# Patient Record
Sex: MALE | Race: WHITE | Employment: OTHER | URBAN - METROPOLITAN AREA
[De-identification: names, ages, dates, MRNs, and addresses within clinical notes are randomized per-mention and may not be internally consistent; named-entity substitution may affect disease eponyms.]

---

## 2017-05-27 ENCOUNTER — HOSPITAL ENCOUNTER (OUTPATIENT)
Age: 72
Discharge: HOME | End: 2017-05-27
Payer: MEDICARE

## 2017-05-27 DIAGNOSIS — N18.30 CKD (CHRONIC KIDNEY DISEASE) STAGE 3, GFR 30-59 ML/MIN (HCC): Chronic | ICD-10-CM

## 2017-05-27 LAB
ANION GAP SERPL CALCULATED.3IONS-SCNC: 11 MMOL/L (ref 9–17)
BUN BLDV-MCNC: 27 MG/DL (ref 8–23)
BUN/CREAT BLD: ABNORMAL (ref 9–20)
CALCIUM IONIZED: 1.26 MMOL/L (ref 1.13–1.33)
CALCIUM SERPL-MCNC: 9.1 MG/DL (ref 8.6–10.4)
CHLORIDE BLD-SCNC: 101 MMOL/L (ref 98–107)
CO2: 28 MMOL/L (ref 20–31)
CREAT SERPL-MCNC: 1.44 MG/DL (ref 0.7–1.2)
CREATININE URINE: 90.3 MG/DL (ref 39–259)
GFR AFRICAN AMERICAN: 59 ML/MIN
GFR NON-AFRICAN AMERICAN: 48 ML/MIN
GFR SERPL CREATININE-BSD FRML MDRD: ABNORMAL ML/MIN/{1.73_M2}
GFR SERPL CREATININE-BSD FRML MDRD: ABNORMAL ML/MIN/{1.73_M2}
GLUCOSE BLD-MCNC: 149 MG/DL (ref 70–99)
PHOSPHORUS: 3.9 MG/DL (ref 2.5–4.5)
POTASSIUM SERPL-SCNC: 4.3 MMOL/L (ref 3.7–5.3)
PTH INTACT: 55.23 PG/ML (ref 15–65)
SODIUM BLD-SCNC: 140 MMOL/L (ref 135–144)
TOTAL PROTEIN, URINE: 10 MG/DL
VITAMIN D 25-HYDROXY: 40.6 NG/ML (ref 30–100)

## 2017-05-27 PROCEDURE — 83970 ASSAY OF PARATHORMONE: CPT

## 2017-05-27 PROCEDURE — 80048 BASIC METABOLIC PNL TOTAL CA: CPT

## 2017-05-27 PROCEDURE — 82306 VITAMIN D 25 HYDROXY: CPT

## 2017-05-27 PROCEDURE — 36415 COLL VENOUS BLD VENIPUNCTURE: CPT

## 2017-05-27 PROCEDURE — 82570 ASSAY OF URINE CREATININE: CPT

## 2017-05-27 PROCEDURE — 82330 ASSAY OF CALCIUM: CPT

## 2017-05-27 PROCEDURE — 84156 ASSAY OF PROTEIN URINE: CPT

## 2017-05-27 PROCEDURE — 84100 ASSAY OF PHOSPHORUS: CPT

## 2017-06-09 PROBLEM — I51.9 LEFT VENTRICULAR DIASTOLIC DYSFUNCTION: Status: ACTIVE | Noted: 2017-06-09

## 2017-06-09 PROBLEM — I51.9 LEFT VENTRICULAR DIASTOLIC DYSFUNCTION: Chronic | Status: ACTIVE | Noted: 2017-06-09

## 2017-06-09 PROBLEM — I25.10 CORONARY ARTERY DISEASE INVOLVING NATIVE CORONARY ARTERY: Status: ACTIVE | Noted: 2017-06-09

## 2017-06-09 PROBLEM — I25.10 CORONARY ARTERY DISEASE INVOLVING NATIVE CORONARY ARTERY: Chronic | Status: ACTIVE | Noted: 2017-06-09

## 2017-10-12 ENCOUNTER — OFFICE VISIT (OUTPATIENT)
Dept: PODIATRY | Age: 72
End: 2017-10-12
Payer: MEDICARE

## 2017-10-12 VITALS
BODY MASS INDEX: 28.09 KG/M2 | HEART RATE: 66 BPM | SYSTOLIC BLOOD PRESSURE: 118 MMHG | WEIGHT: 179 LBS | DIASTOLIC BLOOD PRESSURE: 64 MMHG | HEIGHT: 67 IN

## 2017-10-12 DIAGNOSIS — B35.1 ONYCHOMYCOSIS OF TOENAIL: Primary | ICD-10-CM

## 2017-10-12 DIAGNOSIS — M79.675 PAIN OF TOES OF BOTH FEET: ICD-10-CM

## 2017-10-12 DIAGNOSIS — M20.42 HAMMER TOES OF BOTH FEET: ICD-10-CM

## 2017-10-12 DIAGNOSIS — M79.674 PAIN OF TOES OF BOTH FEET: ICD-10-CM

## 2017-10-12 DIAGNOSIS — E11.51 TYPE 2 DIABETES MELLITUS WITH PERIPHERAL VASCULAR DISEASE (HCC): ICD-10-CM

## 2017-10-12 DIAGNOSIS — M20.41 HAMMER TOES OF BOTH FEET: ICD-10-CM

## 2017-10-12 PROCEDURE — 99203 OFFICE O/P NEW LOW 30 MIN: CPT | Performed by: PODIATRIST

## 2017-10-12 PROCEDURE — 3017F COLORECTAL CA SCREEN DOC REV: CPT | Performed by: PODIATRIST

## 2017-10-12 PROCEDURE — 4040F PNEUMOC VAC/ADMIN/RCVD: CPT | Performed by: PODIATRIST

## 2017-10-12 PROCEDURE — G8598 ASA/ANTIPLAT THER USED: HCPCS | Performed by: PODIATRIST

## 2017-10-12 PROCEDURE — 3046F HEMOGLOBIN A1C LEVEL >9.0%: CPT | Performed by: PODIATRIST

## 2017-10-12 PROCEDURE — 1036F TOBACCO NON-USER: CPT | Performed by: PODIATRIST

## 2017-10-12 PROCEDURE — G8419 CALC BMI OUT NRM PARAM NOF/U: HCPCS | Performed by: PODIATRIST

## 2017-10-12 PROCEDURE — G8484 FLU IMMUNIZE NO ADMIN: HCPCS | Performed by: PODIATRIST

## 2017-10-12 PROCEDURE — 1123F ACP DISCUSS/DSCN MKR DOCD: CPT | Performed by: PODIATRIST

## 2017-10-12 PROCEDURE — G8427 DOCREV CUR MEDS BY ELIG CLIN: HCPCS | Performed by: PODIATRIST

## 2017-10-12 ASSESSMENT — ENCOUNTER SYMPTOMS
COLOR CHANGE: 0
DIARRHEA: 0
NAUSEA: 0
BACK PAIN: 0
SHORTNESS OF BREATH: 0

## 2017-10-12 NOTE — PROGRESS NOTES
[place] monofilament. Musculoskeletal:  Muscle strength is +5/5 to all four muscle groups of the right lower extremity and +5/5 to all four muscle groups of the left lower extremity. There are no areas of subluxation, dislocation, or laxity noted to either lower extremity. Range of motion to the right ankle is  free of pain or grinding. Range of motion to the left ankle is  free of pain or grinding. Range of motion to the right subtalar joint is  free of pain or grinding. Range of motion to the left subtalar joint is  free of pain or grinding. No abnormalities, asymmetries, or misalignments are seen between the extremities. Weightbearing evaluation does not reveal rearfoot eversion, medial prominence of the talar head, loss of the medial longitudinal arch height, and too many toes sign bilaterally. The digits of the right foot are contracted. The digits of the left foot are contracted. There is no prominence noted to the first metatarsal head without abduction of the hallux of the right foot. There is no prominence noted to the first metatarsal head without abduction of the hallux of the left foot. Shoe examination was performed. Biomechanical Exam: normal bilaterally. Asessment: Patient is a 70 y.o. male with:   1. Onychomycosis of toenail     2. Type 2 diabetes mellitus with peripheral vascular disease (HCC)     3. Hammer toes of both feet     4. Pain of toes of both feet         Plan:  1. Clinical evaluation of the patient. 2. Patient instructed on proper diabetic foot care. 3. Contact office with any questions/problems/concerns. Return in about 3 months (around 1/12/2018) for Painful Nails.    10/12/2017      Ijeoma Shankar DPM

## 2017-11-25 ENCOUNTER — HOSPITAL ENCOUNTER (OUTPATIENT)
Age: 72
Discharge: HOME | End: 2017-11-25
Payer: MEDICARE

## 2017-11-25 DIAGNOSIS — N18.30 CKD (CHRONIC KIDNEY DISEASE) STAGE 3, GFR 30-59 ML/MIN (HCC): Chronic | ICD-10-CM

## 2017-11-25 LAB
ANION GAP SERPL CALCULATED.3IONS-SCNC: 10 MMOL/L (ref 9–17)
BUN BLDV-MCNC: 28 MG/DL (ref 8–23)
BUN/CREAT BLD: ABNORMAL (ref 9–20)
CALCIUM SERPL-MCNC: 9.6 MG/DL (ref 8.6–10.4)
CHLORIDE BLD-SCNC: 101 MMOL/L (ref 98–107)
CHOLESTEROL/HDL RATIO: 3.3
CHOLESTEROL: 127 MG/DL
CO2: 29 MMOL/L (ref 20–31)
CREAT SERPL-MCNC: 1.56 MG/DL (ref 0.7–1.2)
CREATININE URINE: 126.5 MG/DL (ref 39–259)
GFR AFRICAN AMERICAN: 53 ML/MIN
GFR NON-AFRICAN AMERICAN: 44 ML/MIN
GFR SERPL CREATININE-BSD FRML MDRD: ABNORMAL ML/MIN/{1.73_M2}
GFR SERPL CREATININE-BSD FRML MDRD: ABNORMAL ML/MIN/{1.73_M2}
GLUCOSE BLD-MCNC: 183 MG/DL (ref 70–99)
HDLC SERPL-MCNC: 39 MG/DL
LDL CHOLESTEROL: 58 MG/DL (ref 0–130)
POTASSIUM SERPL-SCNC: 4.7 MMOL/L (ref 3.7–5.3)
SODIUM BLD-SCNC: 140 MMOL/L (ref 135–144)
TOTAL PROTEIN, URINE: 11 MG/DL
TRIGL SERPL-MCNC: 148 MG/DL
TSH SERPL DL<=0.05 MIU/L-ACNC: 1.55 MIU/L (ref 0.3–5)
VLDLC SERPL CALC-MCNC: ABNORMAL MG/DL (ref 1–30)

## 2017-11-25 PROCEDURE — 36415 COLL VENOUS BLD VENIPUNCTURE: CPT

## 2017-11-25 PROCEDURE — 84156 ASSAY OF PROTEIN URINE: CPT

## 2017-11-25 PROCEDURE — 80048 BASIC METABOLIC PNL TOTAL CA: CPT

## 2017-11-25 PROCEDURE — 84443 ASSAY THYROID STIM HORMONE: CPT

## 2017-11-25 PROCEDURE — 82570 ASSAY OF URINE CREATININE: CPT

## 2017-11-25 PROCEDURE — 80061 LIPID PANEL: CPT

## 2018-06-04 ENCOUNTER — HOSPITAL ENCOUNTER (OUTPATIENT)
Age: 73
Discharge: HOME | End: 2018-06-04
Payer: MEDICARE

## 2018-06-04 DIAGNOSIS — N18.30 CKD (CHRONIC KIDNEY DISEASE) STAGE 3, GFR 30-59 ML/MIN (HCC): Chronic | ICD-10-CM

## 2018-06-04 LAB
ABSOLUTE EOS #: 0.1 K/UL (ref 0–0.4)
ABSOLUTE IMMATURE GRANULOCYTE: ABNORMAL K/UL (ref 0–0.3)
ABSOLUTE LYMPH #: 1.3 K/UL (ref 1–4.8)
ABSOLUTE MONO #: 0.5 K/UL (ref 0.1–1.3)
ANION GAP SERPL CALCULATED.3IONS-SCNC: 10 MMOL/L (ref 9–17)
BASOPHILS # BLD: 0 % (ref 0–2)
BASOPHILS ABSOLUTE: 0 K/UL (ref 0–0.2)
BUN BLDV-MCNC: 28 MG/DL (ref 8–23)
BUN/CREAT BLD: ABNORMAL (ref 9–20)
CALCIUM IONIZED: 1.23 MMOL/L (ref 1.13–1.33)
CALCIUM SERPL-MCNC: 9 MG/DL (ref 8.6–10.4)
CHLORIDE BLD-SCNC: 102 MMOL/L (ref 98–107)
CO2: 28 MMOL/L (ref 20–31)
CREAT SERPL-MCNC: 1.44 MG/DL (ref 0.7–1.2)
CREATININE URINE: 152.8 MG/DL (ref 39–259)
DIFFERENTIAL TYPE: ABNORMAL
EOSINOPHILS RELATIVE PERCENT: 3 % (ref 0–4)
GFR AFRICAN AMERICAN: 58 ML/MIN
GFR NON-AFRICAN AMERICAN: 48 ML/MIN
GFR SERPL CREATININE-BSD FRML MDRD: ABNORMAL ML/MIN/{1.73_M2}
GFR SERPL CREATININE-BSD FRML MDRD: ABNORMAL ML/MIN/{1.73_M2}
GLUCOSE BLD-MCNC: 142 MG/DL (ref 70–99)
HCT VFR BLD CALC: 39.6 % (ref 41–53)
HEMOGLOBIN: 13.5 G/DL (ref 13.5–17.5)
IMMATURE GRANULOCYTES: ABNORMAL %
LYMPHOCYTES # BLD: 25 % (ref 24–44)
MCH RBC QN AUTO: 32.5 PG (ref 26–34)
MCHC RBC AUTO-ENTMCNC: 34.1 G/DL (ref 31–37)
MCV RBC AUTO: 95.4 FL (ref 80–100)
MONOCYTES # BLD: 9 % (ref 1–7)
NRBC AUTOMATED: ABNORMAL PER 100 WBC
PDW BLD-RTO: 14.8 % (ref 11.5–14.9)
PHOSPHORUS: 3.1 MG/DL (ref 2.5–4.5)
PLATELET # BLD: 135 K/UL (ref 150–450)
PLATELET ESTIMATE: ABNORMAL
PMV BLD AUTO: 8.8 FL (ref 6–12)
POTASSIUM SERPL-SCNC: 3.8 MMOL/L (ref 3.7–5.3)
PTH INTACT: 51.21 PG/ML (ref 15–65)
RBC # BLD: 4.15 M/UL (ref 4.5–5.9)
RBC # BLD: ABNORMAL 10*6/UL
SEG NEUTROPHILS: 63 % (ref 36–66)
SEGMENTED NEUTROPHILS ABSOLUTE COUNT: 3.4 K/UL (ref 1.3–9.1)
SODIUM BLD-SCNC: 140 MMOL/L (ref 135–144)
TOTAL PROTEIN, URINE: 12 MG/DL
VITAMIN D 25-HYDROXY: 29.9 NG/ML (ref 30–100)
WBC # BLD: 5.4 K/UL (ref 3.5–11)
WBC # BLD: ABNORMAL 10*3/UL

## 2018-06-04 PROCEDURE — 36415 COLL VENOUS BLD VENIPUNCTURE: CPT

## 2018-06-04 PROCEDURE — 82306 VITAMIN D 25 HYDROXY: CPT

## 2018-06-04 PROCEDURE — 82570 ASSAY OF URINE CREATININE: CPT

## 2018-06-04 PROCEDURE — 83970 ASSAY OF PARATHORMONE: CPT

## 2018-06-04 PROCEDURE — 85025 COMPLETE CBC W/AUTO DIFF WBC: CPT

## 2018-06-04 PROCEDURE — 80048 BASIC METABOLIC PNL TOTAL CA: CPT

## 2018-06-04 PROCEDURE — 84100 ASSAY OF PHOSPHORUS: CPT

## 2018-06-04 PROCEDURE — 84156 ASSAY OF PROTEIN URINE: CPT

## 2018-06-04 PROCEDURE — 82330 ASSAY OF CALCIUM: CPT

## 2018-10-30 ENCOUNTER — HOSPITAL ENCOUNTER (OUTPATIENT)
Age: 73
Discharge: HOME | End: 2018-10-30
Payer: MEDICARE

## 2018-10-30 LAB
ABSOLUTE EOS #: 0.1 K/UL (ref 0–0.4)
ABSOLUTE IMMATURE GRANULOCYTE: ABNORMAL K/UL (ref 0–0.3)
ABSOLUTE LYMPH #: 1.3 K/UL (ref 1–4.8)
ABSOLUTE MONO #: 0.5 K/UL (ref 0.1–1.3)
ALBUMIN SERPL-MCNC: 4.1 G/DL (ref 3.5–5.2)
ALBUMIN/GLOBULIN RATIO: ABNORMAL (ref 1–2.5)
ALP BLD-CCNC: 21 U/L (ref 40–129)
ALT SERPL-CCNC: 21 U/L (ref 5–41)
ANION GAP SERPL CALCULATED.3IONS-SCNC: 12 MMOL/L (ref 9–17)
AST SERPL-CCNC: 15 U/L
BASOPHILS # BLD: 0 % (ref 0–2)
BASOPHILS ABSOLUTE: 0 K/UL (ref 0–0.2)
BILIRUB SERPL-MCNC: 0.65 MG/DL (ref 0.3–1.2)
BUN BLDV-MCNC: 27 MG/DL (ref 8–23)
BUN/CREAT BLD: ABNORMAL (ref 9–20)
CALCIUM SERPL-MCNC: 9.3 MG/DL (ref 8.6–10.4)
CHLORIDE BLD-SCNC: 100 MMOL/L (ref 98–107)
CHOLESTEROL/HDL RATIO: 3.4
CHOLESTEROL: 133 MG/DL
CO2: 26 MMOL/L (ref 20–31)
CREAT SERPL-MCNC: 1.48 MG/DL (ref 0.7–1.2)
CREATININE URINE: 83.1 MG/DL (ref 39–259)
DIFFERENTIAL TYPE: ABNORMAL
EOSINOPHILS RELATIVE PERCENT: 2 % (ref 0–4)
ESTIMATED AVERAGE GLUCOSE: 134 MG/DL
GFR AFRICAN AMERICAN: 57 ML/MIN
GFR NON-AFRICAN AMERICAN: 47 ML/MIN
GFR SERPL CREATININE-BSD FRML MDRD: ABNORMAL ML/MIN/{1.73_M2}
GFR SERPL CREATININE-BSD FRML MDRD: ABNORMAL ML/MIN/{1.73_M2}
GLUCOSE BLD-MCNC: 138 MG/DL (ref 70–99)
HBA1C MFR BLD: 6.3 % (ref 4–6)
HCT VFR BLD CALC: 42.9 % (ref 41–53)
HDLC SERPL-MCNC: 39 MG/DL
HEMOGLOBIN: 14.5 G/DL (ref 13.5–17.5)
IMMATURE GRANULOCYTES: ABNORMAL %
LDL CHOLESTEROL: 65 MG/DL (ref 0–130)
LYMPHOCYTES # BLD: 22 % (ref 24–44)
MAGNESIUM: 1.8 MG/DL (ref 1.6–2.6)
MCH RBC QN AUTO: 32.3 PG (ref 26–34)
MCHC RBC AUTO-ENTMCNC: 33.8 G/DL (ref 31–37)
MCV RBC AUTO: 95.6 FL (ref 80–100)
MICROALBUMIN/CREAT 24H UR: <12 MG/L
MICROALBUMIN/CREAT UR-RTO: NORMAL MCG/MG CREAT
MONOCYTES # BLD: 8 % (ref 1–7)
NRBC AUTOMATED: ABNORMAL PER 100 WBC
PDW BLD-RTO: 13.9 % (ref 11.5–14.9)
PLATELET # BLD: 128 K/UL (ref 150–450)
PLATELET ESTIMATE: ABNORMAL
PMV BLD AUTO: 8.8 FL (ref 6–12)
POTASSIUM SERPL-SCNC: 4.4 MMOL/L (ref 3.7–5.3)
RBC # BLD: 4.49 M/UL (ref 4.5–5.9)
RBC # BLD: ABNORMAL 10*6/UL
SEG NEUTROPHILS: 68 % (ref 36–66)
SEGMENTED NEUTROPHILS ABSOLUTE COUNT: 3.9 K/UL (ref 1.3–9.1)
SODIUM BLD-SCNC: 138 MMOL/L (ref 135–144)
TOTAL PROTEIN: 6.5 G/DL (ref 6.4–8.3)
TRIGL SERPL-MCNC: 143 MG/DL
VLDLC SERPL CALC-MCNC: ABNORMAL MG/DL (ref 1–30)
WBC # BLD: 5.7 K/UL (ref 3.5–11)
WBC # BLD: ABNORMAL 10*3/UL

## 2018-10-30 PROCEDURE — 36415 COLL VENOUS BLD VENIPUNCTURE: CPT

## 2018-10-30 PROCEDURE — 83735 ASSAY OF MAGNESIUM: CPT

## 2018-10-30 PROCEDURE — 83036 HEMOGLOBIN GLYCOSYLATED A1C: CPT

## 2018-10-30 PROCEDURE — 82043 UR ALBUMIN QUANTITATIVE: CPT

## 2018-10-30 PROCEDURE — 80061 LIPID PANEL: CPT

## 2018-10-30 PROCEDURE — 85025 COMPLETE CBC W/AUTO DIFF WBC: CPT

## 2018-10-30 PROCEDURE — 82570 ASSAY OF URINE CREATININE: CPT

## 2018-10-30 PROCEDURE — 80053 COMPREHEN METABOLIC PANEL: CPT

## 2018-11-26 ENCOUNTER — HOSPITAL ENCOUNTER (OUTPATIENT)
Age: 73
Discharge: HOME | End: 2018-11-26
Payer: MEDICARE

## 2018-11-26 DIAGNOSIS — N18.30 CKD (CHRONIC KIDNEY DISEASE) STAGE 3, GFR 30-59 ML/MIN (HCC): Chronic | ICD-10-CM

## 2018-11-26 LAB
ANION GAP SERPL CALCULATED.3IONS-SCNC: 10 MMOL/L (ref 9–17)
BUN BLDV-MCNC: 23 MG/DL (ref 8–23)
BUN/CREAT BLD: ABNORMAL (ref 9–20)
CALCIUM SERPL-MCNC: 9.3 MG/DL (ref 8.6–10.4)
CHLORIDE BLD-SCNC: 100 MMOL/L (ref 98–107)
CO2: 29 MMOL/L (ref 20–31)
CREAT SERPL-MCNC: 1.47 MG/DL (ref 0.7–1.2)
CREATININE URINE: 95.6 MG/DL (ref 39–259)
GFR AFRICAN AMERICAN: 57 ML/MIN
GFR NON-AFRICAN AMERICAN: 47 ML/MIN
GFR SERPL CREATININE-BSD FRML MDRD: ABNORMAL ML/MIN/{1.73_M2}
GFR SERPL CREATININE-BSD FRML MDRD: ABNORMAL ML/MIN/{1.73_M2}
GLUCOSE BLD-MCNC: 131 MG/DL (ref 70–99)
POTASSIUM SERPL-SCNC: 4.1 MMOL/L (ref 3.7–5.3)
SODIUM BLD-SCNC: 139 MMOL/L (ref 135–144)
TOTAL PROTEIN, URINE: 8 MG/DL

## 2018-11-26 PROCEDURE — 84156 ASSAY OF PROTEIN URINE: CPT

## 2018-11-26 PROCEDURE — 80048 BASIC METABOLIC PNL TOTAL CA: CPT

## 2018-11-26 PROCEDURE — 36415 COLL VENOUS BLD VENIPUNCTURE: CPT

## 2018-11-26 PROCEDURE — 82570 ASSAY OF URINE CREATININE: CPT

## 2019-06-07 PROBLEM — Z95.1 HISTORY OF CORONARY ARTERY BYPASS GRAFT: Chronic | Status: ACTIVE | Noted: 2019-06-07

## 2019-06-07 PROBLEM — Z95.1 HISTORY OF CORONARY ARTERY BYPASS GRAFT: Status: ACTIVE | Noted: 2019-06-07

## 2022-09-09 DIAGNOSIS — M25.572 LEFT ANKLE PAIN, UNSPECIFIED CHRONICITY: Primary | ICD-10-CM

## 2022-09-12 ENCOUNTER — OFFICE VISIT (OUTPATIENT)
Dept: ORTHOPEDIC SURGERY | Age: 77
End: 2022-09-12
Payer: MEDICARE

## 2022-09-12 VITALS — WEIGHT: 154 LBS | BODY MASS INDEX: 24.17 KG/M2 | OXYGEN SATURATION: 100 % | RESPIRATION RATE: 16 BRPM | HEIGHT: 67 IN

## 2022-09-12 DIAGNOSIS — S93.402A SPRAIN OF LEFT ANKLE, UNSPECIFIED LIGAMENT, INITIAL ENCOUNTER: Primary | ICD-10-CM

## 2022-09-12 DIAGNOSIS — M19.079 ARTHRITIS OF SUBTALAR JOINT: ICD-10-CM

## 2022-09-12 PROCEDURE — 99204 OFFICE O/P NEW MOD 45 MIN: CPT | Performed by: ORTHOPAEDIC SURGERY

## 2022-09-12 PROCEDURE — G8427 DOCREV CUR MEDS BY ELIG CLIN: HCPCS | Performed by: ORTHOPAEDIC SURGERY

## 2022-09-12 PROCEDURE — 1036F TOBACCO NON-USER: CPT | Performed by: ORTHOPAEDIC SURGERY

## 2022-09-12 PROCEDURE — G8420 CALC BMI NORM PARAMETERS: HCPCS | Performed by: ORTHOPAEDIC SURGERY

## 2022-09-12 PROCEDURE — 1123F ACP DISCUSS/DSCN MKR DOCD: CPT | Performed by: ORTHOPAEDIC SURGERY

## 2022-09-12 NOTE — PROGRESS NOTES
Rfl:     metoprolol (LOPRESSOR) 100 MG tablet, Take 100 mg by mouth 2 times daily, Disp: , Rfl:     SITagliptin (JANUVIA) 50 MG tablet, Take 50 mg by mouth daily, Disp: , Rfl:     apixaban (ELIQUIS) 2.5 MG TABS tablet, Take by mouth 2 times daily, Disp: , Rfl:     isosorbide mononitrate (IMDUR) 60 MG CR tablet, Take 60 mg by mouth 2 times daily , Disp: , Rfl:     aspirin 81 MG tablet, Take 81 mg by mouth daily. , Disp: , Rfl:     omeprazole (PRILOSEC) 20 MG capsule, Take 20 mg by mouth nightly , Disp: , Rfl:     insulin glargine (LANTUS) 100 UNIT/ML injection, Inject 26 Units into the skin nightly , Disp: , Rfl:      Allergies:    Coumadin [warfarin sodium], Morphine, and Doxycycline    Family History:  family history includes Diabetes in his brother, mother, and sister; Heart Disease in his mother and sister; High Blood Pressure in his mother and sister. Social History:   Social History     Occupational History    Not on file   Tobacco Use    Smoking status: Never    Smokeless tobacco: Never   Vaping Use    Vaping Use: Never used   Substance and Sexual Activity    Alcohol use: No     Alcohol/week: 0.0 standard drinks    Drug use: No    Sexual activity: Not on file     Retired    OBJECTIVE:  Resp 16   Ht 5' 6.5\" (1.689 m)   Wt 154 lb (69.9 kg)   SpO2 100%   BMI 24.48 kg/m²    Psych: awake, alert  Cardio:  well perfused extremities, no cyanosis  Resp:  normal respiratory effort  Musculoskeletal:    Affected lower extremity:    Vascular: Limb well perfused, compartments soft/compressible. Skin: No erythema/ulcers. Intact. Neurovascular Status:  Grossly neurovascularly intact throughout  Motion:  Grossly able to fire major muscle groups with appropriate/expected AROM  Tenderness to Palpation: Sinus Tarsi/CFL greater than ATFL  -Able to ashkan the foot  --Instability:  Talar tilt: Negative;  Anterior drawer: Negative  -No peroneal subluxation/dislocation with dorsiflexion + eversion or with circumduction      RADIOLOGY:   9/12/2022 FINDINGS:  Three weightbearing views (AP, Mortise, and Lateral) of the left ankle and three weightbearing views (AP, Oblique, Lateral) of the left foot were obtained in the office today and reviewed, revealing no acute fracture, dislocation, or radioopaque foreign body/tumor. The ankle mortise is maintained with no widening of the clear spaces. Overall alignment is satisfactory. Degenerative changes at the subtalar joint with joint space narrowing, sclerosis, and osteophytes. IMPRESSION:  No acute fracture/dislocation. Electronically signed by Rachel Donaldson MD       ASSESSMENT AND PLAN:  Body mass index is 24.48 kg/m². He has left lateral ankle/foot pain, possibly secondary to an ankle sprain sustained around 6/12/2022. 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 Notably, he has the past medical history as above. He has a history of GERD, coronary artery disease status post CABG, chronic kidney disease, vascular disease with a history of claudication, and type 2 diabetes. We had a discussion today about the likely diagnosis and its natural history, physical exam and imaging findings, as well as various treatment options in detail. Surgically, we discussed a possible future surgery, depending on the patient's clinical course. At today's visit, we did decide to proceed with conservative management. Orders/referrals were placed as below at today's visit. The patient was provided a lace up brace, for stability and pain control, and to help decrease the risk of reinjury. The patient may weight bear as tolerated, and was cautioned to avoid pain provoking activity. The patient was referred to physical therapy. I provided a prescription for Voltaren (4g TOPL q QID PRN pain). I recommend this over the use of oral NSAIDs because of the patient's history of GERD/GI ulcer.     All questions were answered and the above plan was agreed upon. The patient will return to clinic in 6 weeks PRN without x-rays. At his next visit, we may consider an MRI and/or subtalar joint injection. At the patient's next visit, depending on how the patient is doing and/or new imaging/labs results, we may consider the following options:    []  Lace up ankle     []  CAM boot         []  removable wrist brace     []  PT:        []  Wean out immobilization         []  Adv activity      []  Footmind        []  Spenco       []  Custom Orthotic:               []  AZ brace                    []  Rocker Bottom      []  Night splint    []  Heel cups        []  Strap        []  Toe gizmos    []  Topl        []  NSAIDs         []  Ayla        []  Ref:         []  Stress Xray    []  CT        []  MRI  []  Inj:          []  Consider OR      []  Pick OR date    No follow-ups on file. No orders of the defined types were placed in this encounter. No orders of the defined types were placed in this encounter. Temo Leach MD  Orthopedic Surgery        Please excuse any typos/errors, as this note was created with the assistance of voice recognition software. While intending to generate a document that actually reflects the content of the visit, the document can still have some errors including those of syntax and sound-a-like substitutions which may escape proof reading. In such instances, actual meaning can be extrapolated by context.

## 2022-09-12 NOTE — LETTER
[address]  [facility] [address] [facility] [address]  Phone: [phone]  Fax: [phone]           Sedrick Yign MD      September 12, 2022     Patient: Dirk Lynch   MR Number: [id number]   YOB: 1945   Date of Visit: 9/12/2022       Dear Dr. Avani Ac:    Thank you for referring Twan Pennington to me for evaluation/treatment. Below are the relevant portions of my assessment and plan of care. He has left lateral ankle/foot pain, possibly secondary to an ankle sprain sustained around 6/12/2022. 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 Notably, he has the past medical history as above. He has a history of GERD, coronary artery disease status post CABG, chronic kidney disease, vascular disease with a history of claudication, and type 2 diabetes. We had a discussion today about the likely diagnosis and its natural history, physical exam and imaging findings, as well as various treatment options in detail. Surgically, we discussed a possible future surgery, depending on the patient's clinical course. At today's visit, we did decide to proceed with conservative management. Orders/referrals were placed as below at today's visit. The patient was provided a lace up brace, for stability and pain control, and to help decrease the risk of reinjury. The patient may weight bear as tolerated, and was cautioned to avoid pain provoking activity. The patient was referred to physical therapy. I provided a prescription for Voltaren (4g TOPL q QID PRN pain). I recommend this over the use of oral NSAIDs because of the patient's history of GERD/GI ulcer. All questions were answered and the above plan was agreed upon. The patient will return to clinic in 6 weeks PRN without x-rays. At his next visit, we may consider an MRI and/or subtalar joint injection.            If you have questions, please do not hesitate to call me. I look forward to following Paresh cMmahon along with you.     Sincerely,        Raúl Gutierrez MD    CC providers:  Danika Linares MD  [address] [place] [address],[address]  [address] [facility] [address]  [employer] [employer]

## 2022-09-19 ENCOUNTER — HOSPITAL ENCOUNTER (OUTPATIENT)
Dept: PHYSICAL THERAPY | Age: 77
Setting detail: THERAPIES SERIES
Discharge: HOME | End: 2022-09-19
Payer: MEDICARE

## 2022-09-19 PROCEDURE — 97140 MANUAL THERAPY 1/> REGIONS: CPT

## 2022-09-19 PROCEDURE — 97162 PT EVAL MOD COMPLEX 30 MIN: CPT

## 2022-09-19 PROCEDURE — 97110 THERAPEUTIC EXERCISES: CPT

## 2022-09-19 NOTE — PROGRESS NOTES
Three weightbearing views (AP, Mortise, and Lateral)    of the left ankle and three weightbearing views (AP, Oblique, Lateral) of    the left foot were obtained in the office today and reviewed, revealing no    acute fracture, dislocation, or radioopaque foreign body/tumor. The ankle    mortise is maintained with no widening of the clear spaces. Overall    alignment is satisfactory. Degenerative changes at the subtalar joint    with joint space narrowing, sclerosis, and osteophytes.        9/12/22    Medications: [x] Refer to full medical record [] None [] Other:  Allergies:      [x] Refer to full medical record [] None [] Other:    Function:  Hand Dominance  [] Right  [] Left  Working:  [] Normal Duty  [] Light Duty  [] Off D/T Condition  [] Retired    [] Not Employed    []  Disability  [] Other:           Return to work:   Job/ADL Description:retired    Previously (I) with all functional activity, currently limited as listed below    ADL/IADL Previous level of function Current level of function Who currently assists the patient with task   Bathing  [] Independent  [] Assist [] Independent  [] Assist    Dress/grooming [] Independent  [] Assist [] Independent  [] Assist    Transfer/mobility [] Independent  [] Assist [] Independent  [] Assist    Feeding [] Independent  [] Assist [] Independent  [] Assist    Toileting [] Independent  [] Assist [] Independent  [] Assist    Driving [] Independent  [] Assist [] Independent  [] Assist    Housekeeping [] Independent  [] Assist [x] Independent  [] Assist Difficulty prolonged walking, squatting/kneeling   Grocery shop/meal prep [] Independent  [] Assist [x] Independent  [] Assist Difficulty prolonged walking     Gait Prior level of function Current level of function    [] Independent  [] Assist [] Independent  [] Assist   Device: [] Independent [] Independent    [] Straight Cane [] Quad cane [] Straight Cane [] Quad cane    [] Standard walker [] Rolling walker   [] 4 wheeled walker [] Standard walker [] Rolling walker   [] 4 wheeled walker    [] Wheelchair [] Wheelchair   Mild antalgic gait, did not wear brace into clinic but using for community ambulation per the patient. Pain:  [x] Yes  [] No Location: (L) lateral ankle  Pain Rating: (0-10 scale) 2-3 burning/10  Pain altered Tx:  [] Yes  [] No  Action:  Symptoms:  [] Improving [] Worsening [] Same  Better:  [] AM    [] PM    [] Sit    [] Rise/Sit    []Stand    [] Walk    [] Lying    [] Other:  Worse: [] AM    [] PM    [] Sit    [] Rise/Sit    []Stand    [] Walk    [] Lying    [] Bend                             [] Valsalva    [] Other:  Sleep: [] OK    [] Disturbed    Objective:  AROM:  DF 5 degrees (B)  PF (R) 60 degrees, (L) 50 degrees lateral ankle joint discomfort  INV (R) 45 degrees, (L) 45 degrees  Eversion (R) 20 degrees, (L) 15 degrees with lateral ankle discomfort    Strength:  DF, INV over edge of table (B) 4+/5  PF, EV over edge of table (R) 4+/5, (L) 4/5 with lateral ankle joint discomfort    Able to do (B) heel raise with minimal to no discomfort  SL heel raise (R) able, (L) unable    Balance:  Tandem 8 seconds (L) behind (R), 10 seconds with sway (R) behind (L)  Tandem foam 5 seconds (L) behind (R), 8 seconds (R) behind (L) WIDER BASE    OBSERVATION No Deficit Deficit Not Tested Comments   Posture       Forward Head [] [] []    Rounded Shoulders [] [] []    Kyphosis [] [] []    Lordosis [] [] []    Lateral Shift [] [] []    Scoliosis [] [] []    Iliac Crest [] [] []    PSIS [] [] []    ASIS [] [] []    Genu Valgus [] [] []    Genu Varus [] [] []    Genu Recurvatum [] [] []    Pronation [] [] []    Supination [] [] []    Leg Length Discrp [] [] []    Slumped Sitting [] [] []    Palpation [] [] [] MOD TTP lateral ankle joint, area of  ATF. Sensation [] [] []    Edema [] [] [] Bimalleolar right (R) 23, (L) 22.5cm  Figure 8 girth (R) 51, (L) 50cm  Does not exhibit significant swelling with gross inspection (L). Neurological [] [] []    Patellar Mobility [] [] []    Patellar Orientation [] [] []    Gait [] [] [] Analysis:         FUNCTION Normal Difficult Unable   Sitting [x] [] []   Standing [x] [] []   Ambulation [] [x] []   Groom/Dress [x] [] []   Lift/Carry [] [x] []   Stairs [] [x] []   Bending [] [x] []   Squat [] [x] []   Kneel [] [x] []                                   [place] Fall Risk Assessment    Risk Factor Scale  Score   History of Falls [] Yes  [] No 25  0 0   Secondary Diagnosis [] Yes  [] No 15  0 0   Ambulatory Aid [] Furniture  [] Crutches/cane/walker  [] None/bedrest/wheelchair/nurse 30  15  0 0   IV/Heparin Lock [] Yes  [] No 20  0 0   Gait/Transferring [] Impaired  [] Weak  [] Normal/bedrest/immobile 20  10  0 0   Mental Status [] Forgets limitations  [] Oriented to own ability 15  0 0      Total:0     Based on the Assessment score: check the appropriate box. [x]  No intervention needed   Low =   Score of 0-24    []  Use standard prevention interventions Moderate =  Score of 24-44   [] Give patient handout and discuss fall prevention strategies   [] Establish goal of education for patient/family RE: fall prevention strategies    []  Use high risk prevention interventions High = Score of 45 and higher   [] Give patient handout and discuss fall prevention strategies   [] Establish goal of education for patient/family Re: fall prevention strategies   [] Discuss lifeline / other resources    Electronically signed by:   Radha Delgado PT  Date: 9/19/2022     Comments:    Assessment:  Patient limited with balance, strength, functional strength, ROM, and functional tolerance of stairs, transferring in/out of truck, ambulation consistent with DX of lateral ankle sprain. [x] Patient would continue to benefit from skilled physical therapy services in order to: improve mobility, normalize strength. Problems:    [x] ? Pain: 2-3/10 burning    [x] ?  ROM: Limited with end range plantar flexion and eversion, (B) DF    [x] ? Strength: Limited with SL heel raise, limited minimally with eversion and PF in long sitting testing   [x] ? Function:  Limited with functional tolerance of stairs, transferring in/out of truck, prolonged walking        STG: (to be met in 8 treatments)  ? Pain: improved pain 0-1/10  ? ROM: Normalized ankle ROM with end range discomfort only  ? Strength: 4+/5 long sitting testing  ? Function: Able to do 6\" stairs in clinic with minimal to no difficulty, improved tolerance of walking up to 20 minutes with minimal to no complaints of pain or instability, improved balance tandem to 10 seconds (B)  Independent with Home Exercise Programs    LTG: (to be met in 12 treatments)  ? Pain: improved pain 0/10  ? ROM: Normalized ankle ROM with NO end range discomfort  ? Strength: 4+/5 long sitting testing  ?  Function: Able to do 8\" stairs in clinic with minimal to no difficulty, improved tolerance of walking as needed without complaints of instability; improved tandem balance with foam to 10 seconds (B)  Independent with Home Exercise Programs                   Patient goals: get rid of burning      Functional Assessment Used: optimal testing 8/3 41% limited for ambulation, ambulation long distances, stairs   Current Status: Score  Goal Status: Score 5/3 or better    Evaluation Complexity:  History (Personal factors, comorbidities) [] 0 [x] 1-2 [] 3+   Exam (limitations, restrictions) [] 1-2 [x] 3 [] 4+   Clinical presentation (progression) [] Stable [x] Evolving  [] Unstable   Decision Making [] Low [x] Moderate [] High    [] Low Complexity [x] Moderate Complexity [] High Complexity       Rehab Potential:  [x] Good  [] Fair  [x] Poor   Suggested Professional Referral:  [] No  [] Yes:  Barriers to Goal Achievement[de-identified]  [] No  [] Yes:  Domestic Concerns:  [] No  [] Yes:    Pt. Education:  [x] Plans/Goals, Risks/Benefits discussed  [x] Home exercise program    Method of Education: [x] Verbal  [] Demo  [x] Written  Comprehension of Education:  [] Verbalizes understanding. [] Demonstrates understanding. [] Needs Review. [] Demonstrates/verbalizes understanding of HEP/Ed previously given. Treatment Plan:  [x] Therapeutic Exercise   28646  [] Iontophoresis: 4 mg/mL Dexamethasone Sodium Phosphate  mAmin  21343   [] Therapeutic Activity  46477 [x] Vasopneumatic cold with compression  31793 PRN pain   [x] Gait Training   54664 [] Ultrasound   07238   [x] Neuromuscular Re-education  47832 [] Electrical Stimulation Unattended  45500   [x] Manual Therapy  73649 [] Electrical Stimulation Attended  04995   [x] Instruction in HEP  [] Lumbar/Cervical Traction  99935   [] Aquatic Therapy   61056 [] Cold/hotpack    [] Massage   56406      [] Dry Needling, 1 or 2 muscles  03457   [] Biofeedback, first 15 minutes   89520  [] Biofeedback, additional 15 minutes   87084 [] Dry Needling, 3 or more muscles  34457       []  Medication allergies reviewed for use of    Dexamethasone Sodium Phosphate 4mg/ml     with iontophoresis treatments. Pt is not allergic.     Frequency:  2-3 x/week for 12 visits        Todays Treatment:  Modalities:   Precautions:  Exercises:  Exercise Reps/ Time Weight/ Level Comments   (B) heel raise 15     Tandem balance 10\" x 2     Tandem balance foams wider base 10\" x 2     Supine calf stretch 3 x 20\"     Supine inversion stretch  3 x 20\"  towel   Other: Manual- traction, posterior talar glide, PROM  See above exercises for HEP    Specific Instructions for next treatment:    Treatment Charges: Mins Units   [x] Evaluation       []  Low       [x]  Moderate       []  High 15 1   []  Modalities     [x]  Ther Exercise 20 1   [x]  Manual Therapy 10 1   []  Ther Activities     []  Aquatics     []  Neuromuscular     []  Gait Training     []  Dry Needling           1-2 muscles     []  Dry Needling           3 or more          muscles     [] Vasocompression     []  Other         TOTAL TREATMENT TIME: 45    Time in:1000   Time Out:1045    Electronically signed by: Linette Lai PT        Physician Signature:________________________________Date:__________________  By signing above or cosigning this note, I have reviewed this plan of care and certify a need for medically necessary rehabilitation services.      *PLEASE SIGN ABOVE AND FAX BACK ALL PAGES*

## 2022-09-22 ENCOUNTER — HOSPITAL ENCOUNTER (OUTPATIENT)
Dept: PHYSICAL THERAPY | Age: 77
Setting detail: THERAPIES SERIES
Discharge: HOME | End: 2022-09-22
Payer: MEDICARE

## 2022-09-22 PROCEDURE — 97140 MANUAL THERAPY 1/> REGIONS: CPT

## 2022-09-22 PROCEDURE — 97110 THERAPEUTIC EXERCISES: CPT

## 2022-09-22 NOTE — FLOWSHEET NOTE
[facility]   [address]   Phone: [phone]   Fax: [phone]    Physical Therapy Daily Treatment Note      Date:  2022  Patient Name:  Meena Larson    :  1945  MRN: [id number]  Physician: Regine Lal MD                             Insurance: /[facility] secondary MEDICAL NECESSITY  Medical Diagnosis: sprain of (L) ankle S93.402A                Rehab Codes: (L) ankle pain M25.572  Onset date: referral 22             Next Dr's appt.: PRN  Visit Count:                                 Cancel/No Show: 0/0    Subjective:    : Patient arrived noting pain is non existent at the moment. Patient stats pain mostly occurs going up and down steps. Pain:  [] Yes  [x] No Location: L Lateral ankle  Pain Rating: (0-10 scale) denies /10, pain only with movements up/down steps, heel raise 5/10   Pain altered Tx:  [] No  [] Yes  Action:    Objective:  Modalities: CP 5' post exercise/manual   Precautions:  Exercises:  Exercise Reps/ Time Weight/ Level Comments Completed today    Schwinn warm up  4'  Seat 3 x   (B) heel raise 15 x 2    inc pain after 10, break, then resumed  x   Tandem balance 10\" x 2        Tandem balance foams wider base 10\" x 2        3 way hip 10x2  Stance on LLE only  x   BAPS- IV/EV/PF/DF/CW/CCW 10x2 ea Lvl 2   x   Fitter Board  10x2  Fwd/lat  x   SB stretch neutral/inv  3x30\"   x   Heel taps off step fwd/lat  15x 4'  x   Seated L ankle AROM  10x2 Yellow   x                 Other: Manual- traction, posterior talar glide, PROM- 8'   See above exercises for HEP         Assessment:   : Initiated gym program with emphasis on improving mobilization and normalizing strength in L ankle. Patient required cueing for proper exercise technique especially during BAPs,and fitter board exercise to refrain from hip/knee compensation.  Patient noted increased burning/fatiguing pain with B heel raises,and plantar flexion during resisted AROM. Patient continued to be moderately tender to touch in L lateral ankle, applied cold pack post exercise and manual for 5 minutes for pain and edema control. Educated and encouraged patient to ice intermittenly daily for pain and edema control with verbal confirmation. [x] Progressing toward goals. [] No change. [] Other:    [] Patient would continue to benefit from skilled physical therapy services in order to: improve mobility, normalize strength. STG: (to be met in 8 treatments)  ? Pain: improved pain 0-1/10  ? ROM: Normalized ankle ROM with end range discomfort only  ? Strength: 4+/5 long sitting testing  ? Function: Able to do 6\" stairs in clinic with minimal to no difficulty, improved tolerance of walking up to 20 minutes with minimal to no complaints of pain or instability, improved balance tandem to 10 seconds (B)  Independent with Home Exercise Programs     LTG: (to be met in 12 treatments)  ? Pain: improved pain 0/10  ? ROM: Normalized ankle ROM with NO end range discomfort  ? Strength: 4+/5 long sitting testing  ? Function: Able to do 8\" stairs in clinic with minimal to no difficulty, improved tolerance of walking as needed without complaints of instability; improved tandem balance with foam to 10 seconds (B)  Independent with Home Exercise Programs                    Patient goals: get rid of burning    Pt. Education:  [x] Yes  [] No  [] Reviewed Prior HEP/Ed  Method of Education: [x] Verbal  [] Demo  [] Written  Comprehension of Education:  [x] Verbalizes understanding. [] Demonstrates understanding. [] Needs review. [] Demonstrates/verbalizes HEP/Ed previously given. Plan: [x] Continue per plan of care.    [] Other:      Treatment Charges: Mins Units   []  Modalities     [x]  Ther Exercise 31 2   [x]  Manual Therapy 8 1   []  Ther Activities     []  Aquatics     []  Neuromuscular     [] Vasocompression     [] Gait Training     [] Dry needling        [] 1 or 2 muscles [] 3 or more muscles     [x]  Other- CP 5 0   Total Treatment time 44 3     Time In:844 am            Time Out: 102 am (-4 minutes for warm up)    Electronically signed by:  Fatimah Duron PTA

## 2022-09-26 ENCOUNTER — HOSPITAL ENCOUNTER (OUTPATIENT)
Dept: PHYSICAL THERAPY | Age: 77
Setting detail: THERAPIES SERIES
Discharge: HOME | End: 2022-09-26
Payer: MEDICARE

## 2022-09-26 PROCEDURE — 97110 THERAPEUTIC EXERCISES: CPT

## 2022-09-26 PROCEDURE — 97140 MANUAL THERAPY 1/> REGIONS: CPT

## 2022-09-26 NOTE — FLOWSHEET NOTE
[facility]   [address]   Phone: [phone]   Fax: [phone]    Physical Therapy Daily Treatment Note      Date:  2022  Patient Name:  Reagan John    :  1945  MRN: [id number]  Physician: Jaelyn Singh MD                             Insurance: /[facility] secondary MEDICAL NECESSITY  Medical Diagnosis: sprain of (L) ankle S93.402A                Rehab Codes: (L) ankle pain M25.572  Onset date: referral 22             Next Dr's appt.: PRN  Visit Count: 3/12                                Cancel/No Show: 0/0    Subjective:    :   Pain:  [x] Yes  [] No Location: L Lateral ankle  Pain Rating: (0-10 scale) 2/10  Pain altered Tx:  [] No  [] Yes  Action:    Objective:  Modalities: CP 5' post exercise/manual   Precautions:  Exercises:  Exercise Reps/ Time Weight/ Level Comments Completed today    Bard warm up  5'  Seat 3 while collecting subjective data x   (B) heel raise 15 x 2    inc pain after 10, break, then resumed  x   Tandem balance 20\" x 3     x   Tandem balance foams wider base 10\" x 2        3 way hip 10x2  Stance on LLE only  x   BAPS- IV/EV/PF/DF/CW/CCW 10x2 ea Lvl 2   x   Fitter Board  10x2  Fwd/lat  x   SB stretch neutral/inv  3x30\"   x   Heel taps off step fwd/lat  15x 4'     Seated L ankle AROM  10x2 Yellow   x   SLS 3x30\"  2 finger tip support x          Other: Manual- traction, posterior talar glide, in combination with DF stretching x 8'            Assessment:     [x] Progressing toward goals. Continued to focus on improving L ankle strength and neuromuscular control. Increased time with tandem stance and added SLS for additional challenge to muscle control. Slight increased pain reported at end of session that patient attributed mainly to muscle fatigue. [] No change.      [] Other:    [] Patient would continue to benefit from skilled physical therapy services in order to: improve mobility, normalize strength. STG: (to be met in 8 treatments)  ? Pain: improved pain 0-1/10  ? ROM: Normalized ankle ROM with end range discomfort only  ? Strength: 4+/5 long sitting testing  ? Function: Able to do 6\" stairs in clinic with minimal to no difficulty, improved tolerance of walking up to 20 minutes with minimal to no complaints of pain or instability, improved balance tandem to 10 seconds (B)  Independent with Home Exercise Programs     LTG: (to be met in 12 treatments)  ? Pain: improved pain 0/10  ? ROM: Normalized ankle ROM with NO end range discomfort  ? Strength: 4+/5 long sitting testing  ? Function: Able to do 8\" stairs in clinic with minimal to no difficulty, improved tolerance of walking as needed without complaints of instability; improved tandem balance with foam to 10 seconds (B)  Independent with Home Exercise Programs                    Patient goals: get rid of burning    Pt. Education:  [x] Yes  [] No  [] Reviewed Prior HEP/Ed  Method of Education: [x] Verbal  [] Demo  [] Written  Comprehension of Education:  [x] Verbalizes understanding. [] Demonstrates understanding. [] Needs review. [] Demonstrates/verbalizes HEP/Ed previously given. Plan: [x] Continue per plan of care.    [] Other:      Treatment Charges: Mins Units   []  Modalities     [x]  Ther Exercise 32 2   [x]  Manual Therapy 8 1   []  Ther Activities     []  Aquatics     []  Neuromuscular     [] Vasocompression     [] Gait Training     [] Dry needling        [] 1 or 2 muscles        [] 3 or more muscles     []  Other- CP     Total Treatment time 40 3     Time In: 2:47 pm            Time Out: 3:27 pm     Electronically signed by:  Eric Grace PTA

## 2022-09-30 ENCOUNTER — HOSPITAL ENCOUNTER (OUTPATIENT)
Dept: PHYSICAL THERAPY | Age: 77
Setting detail: THERAPIES SERIES
Discharge: HOME | End: 2022-09-30
Payer: MEDICARE

## 2022-09-30 NOTE — FLOWSHEET NOTE
[] [facility]) - [facility]  [address]  [place]: [phone]   F: [phone]     Physical Therapy Cancel/No Show note    Date: 2022  Patient: Brandon Yi  : 1945  MRN: [id number]    Visit Count: 3/12  Cancels/No Shows to date:     For today's appointment patient:    [x]  Cancelled    [] Rescheduled appointment    [] No-show     Reason given by patient:    []  Patient ill    []  Conflicting appointment    [] No transportation      [] Conflict with work    [x] No reason given    [] Weather related    [] [id number]    [] Other:      Comments:        [] Next appointment was confirmed    Electronically signed by: Evans Mendez PTA

## 2022-10-03 ENCOUNTER — HOSPITAL ENCOUNTER (OUTPATIENT)
Dept: PHYSICAL THERAPY | Age: 77
Setting detail: THERAPIES SERIES
Discharge: HOME | End: 2022-10-03
Payer: MEDICARE

## 2022-10-03 PROCEDURE — 97140 MANUAL THERAPY 1/> REGIONS: CPT

## 2022-10-03 PROCEDURE — 97110 THERAPEUTIC EXERCISES: CPT

## 2022-10-03 NOTE — FLOWSHEET NOTE
[facility]   [address]   Phone: [phone]   Fax: [phone]    Physical Therapy Daily Treatment Note      Date:  10/3/2022  Patient Name:  Sharona Arango    :  1945  MRN: [id number]  Physician: Lacey Jensen MD                             Insurance: /[facility] secondary MEDICAL NECESSITY  Medical Diagnosis: sprain of (L) ankle S93.402A                Rehab Codes: (L) ankle pain M25.572  Onset date: referral 22             Next Dr's appt.: PRN  Visit Count:                                 Cancel/No Show: 1/0    Subjective:  Patient frustrated with continued limitations with ankle pain, states pain feels like it is \"deep\" in the lateral ankle joint with complaints of burning. Had issues last treatment session with attempting the heel raises, and has been noting pain intermittently with walking, general activities. Pain:  [x] Yes  [] No Location: L Lateral ankle  Pain Rating: (0-10 scale) 2/10  Pain altered Tx:  [] No  [] Yes  Action:    Objective:  Modalities: CP 5' post exercise/manual   Precautions:  Exercises:  Exercise Reps/ Time Weight/ Level Comments Completed today    Schwinn warm up  5'  Seat 3 while collecting subjective data X- not included in billable time   (B) heel raise 15 x 2    held today pain    Tandem balance 20\" x 3     x   Tandem balance foams wider base 10\" x 2     X   3 way hip 10x2  Stance on LLE only  x   BAPS- IV/EV/PF/DF/CW/CCW 10x2 ea Lvl 2   x   Fitter Board  10x2  Fwd/lat  x   SB stretch neutral/inv  3x30\"   x   Heel taps off step fwd/lat  15x 4'     Seated L ankle AROM  10x2 Yellow      SLS 3x30\"  2 finger tip support x          Other: Manual- traction, posterior talar glide, in combination with DF stretching x 10'            Assessment:   [x] Progressing toward goals. Held heel raises today because they irritated his symptoms today.   Was able to tolerate other standing balance exercises, and plan progression of balance and ankle stabilization as allowed per patient soreness level in the lateral ankle. [] No change. [] Other:    [] Patient would continue to benefit from skilled physical therapy services in order to: improve mobility, normalize strength. STG: (to be met in 8 treatments)  ? Pain: improved pain 0-1/10  ? ROM: Normalized ankle ROM with end range discomfort only  ? Strength: 4+/5 long sitting testing  ? Function: Able to do 6\" stairs in clinic with minimal to no difficulty, improved tolerance of walking up to 20 minutes with minimal to no complaints of pain or instability, improved balance tandem to 10 seconds (B)  Independent with Home Exercise Programs     LTG: (to be met in 12 treatments)  ? Pain: improved pain 0/10  ? ROM: Normalized ankle ROM with NO end range discomfort  ? Strength: 4+/5 long sitting testing  ? Function: Able to do 8\" stairs in clinic with minimal to no difficulty, improved tolerance of walking as needed without complaints of instability; improved tandem balance with foam to 10 seconds (B)  Independent with Home Exercise Programs                    Patient goals: get rid of burning    Pt. Education:  [x] Yes  [] No  [] Reviewed Prior HEP/Ed  Method of Education: [x] Verbal  [] Demo  [] Written  Comprehension of Education:  [x] Verbalizes understanding. [] Demonstrates understanding. [] Needs review. [] Demonstrates/verbalizes HEP/Ed previously given. Plan: [x] Continue per plan of care.    [] Other:      Treatment Charges: Mins Units   []  Modalities     [x]  Ther Exercise 30 2   [x]  Manual Therapy 10 1   []  Ther Activities     []  Aquatics     []  Neuromuscular     [] Vasocompression     [] Gait Training     [] Dry needling        [] 1 or 2 muscles        [] 3 or more muscles     []  Other- CP     Total Treatment time 40 3     Time In: 3776        Time Out: 1200PM    Electronically signed by:  Ervin Lieberman PT

## 2022-10-07 ENCOUNTER — HOSPITAL ENCOUNTER (OUTPATIENT)
Dept: PHYSICAL THERAPY | Age: 77
Setting detail: THERAPIES SERIES
Discharge: HOME | End: 2022-10-07
Payer: MEDICARE

## 2022-10-07 PROCEDURE — 97140 MANUAL THERAPY 1/> REGIONS: CPT

## 2022-10-07 PROCEDURE — 97110 THERAPEUTIC EXERCISES: CPT

## 2022-10-07 NOTE — FLOWSHEET NOTE
[facility]   [address]   Phone: [phone]   Fax: [phone]    Physical Therapy Daily Treatment Note      Date:  10/7/2022  Patient Name:  Shazia Brown    :  1945  MRN: [id number]  Physician: Thien Crain MD                             Insurance: /[facility] secondary MEDICAL NECESSITY  Medical Diagnosis: sprain of (L) ankle S93.402A                Rehab Codes: (L) ankle pain M25.572  Onset date: referral 22             Next Dr's appt.: PRN  Visit Count:                                 Cancel/No Show: 1/0    Subjective:  Patient continues to note significant ankle pain and complaints of pain in the (L) calf. Patient with visible limp that occurred with his first few steps getting up from lobby area, but this was better as he walked more into the clinic. Pain:  [x] Yes  [] No Location: L Lateral ankle  Pain Rating: (0-10 scale) 2/10  Pain altered Tx:  [] No  [] Yes  Action:    Objective:  Modalities:   Precautions:  Exercises:  Exercise Reps/ Time Weight/ Level Comments Completed today    Schwinn warm up  5'  Seat 3 while collecting subjective data X- not included in billable time   (B) heel raise 15 x 2    held today pain    Tandem balance 10\" x 3     x   Tandem balance foam wider base 10\" x 2     X   3 way hip 10x2  Stance on LLE only  x   BAPS- IV/EV/PF/DF/CW/CCW 10x2 ea Lvl 2      Fitter Board  10x2  Fwd/lat  x   SB stretch neutral/inv  3x30\"   x   Heel taps off step fwd/lat  15x 4'     Seated L ankle AROM  10x2 Yellow      SLS 3x10\"  2 finger tip support x   TG heel raise 10x  L10    Other: Manual- traction, posterior talar glide, in combination with DF stretching; soft tissue in midcalf region x 10'            Assessment:   [x] Progressing toward goals. Heel raises again painful, attempted on total gym- was able to do but afterwards patient stated it \"was about the same\" as attempting in standing.   Tenderness in the mid to distal calf muscle and into the lateral ankle joint with heel raises. [] No change. [] Other:    [x] Patient would continue to benefit from skilled physical therapy services in order to: improve mobility, normalize strength. STG: (to be met in 8 treatments)  ? Pain: improved pain 0-1/10  ? ROM: Normalized ankle ROM with end range discomfort only  ? Strength: 4+/5 long sitting testing  ? Function: Able to do 6\" stairs in clinic with minimal to no difficulty, improved tolerance of walking up to 20 minutes with minimal to no complaints of pain or instability, improved balance tandem to 10 seconds (B)  Independent with Home Exercise Programs     LTG: (to be met in 12 treatments)  ? Pain: improved pain 0/10  ? ROM: Normalized ankle ROM with NO end range discomfort  ? Strength: 4+/5 long sitting testing  ? Function: Able to do 8\" stairs in clinic with minimal to no difficulty, improved tolerance of walking as needed without complaints of instability; improved tandem balance with foam to 10 seconds (B)  Independent with Home Exercise Programs                    Patient goals: get rid of burning    Pt. Education:  [x] Yes  [] No  [] Reviewed Prior HEP/Ed  Method of Education: [x] Verbal  [] Demo  [] Written  Comprehension of Education:  [x] Verbalizes understanding. [] Demonstrates understanding. [] Needs review. [] Demonstrates/verbalizes HEP/Ed previously given. Plan: [x] Continue per plan of care.    [] Other:      Treatment Charges: Mins Units   []  Modalities     [x]  Ther Exercise 30 2   [x]  Manual Therapy 10 1   []  Ther Activities     []  Aquatics     []  Neuromuscular     [] Vasocompression     [] Gait Training     [] Dry needling        [] 1 or 2 muscles        [] 3 or more muscles     []  Other- CP     Total Treatment time 40 3     Time In: 1130      Time Out: 1215PM    Electronically signed by:  Luiza Cabrera, PT

## 2022-10-12 ENCOUNTER — HOSPITAL ENCOUNTER (OUTPATIENT)
Dept: PHYSICAL THERAPY | Age: 77
Setting detail: THERAPIES SERIES
Discharge: HOME | End: 2022-10-12
Payer: MEDICARE

## 2022-10-12 PROCEDURE — 97110 THERAPEUTIC EXERCISES: CPT

## 2022-10-12 PROCEDURE — 97140 MANUAL THERAPY 1/> REGIONS: CPT

## 2022-10-12 NOTE — FLOWSHEET NOTE
[facility]   [address]   Phone: [phone]   Fax: [phone]    Physical Therapy Daily Treatment Note      Date:  10/12/2022  Patient Name:  Carlos Escalona    :  1945  MRN: [id number]  Physician: Nena Argueta MD                             Insurance: /[facility] secondary MEDICAL NECESSITY  Medical Diagnosis: sprain of (L) ankle S93.402A                Rehab Codes: (L) ankle pain M25.572  Onset date: referral 22             Next Dr's appt.: PRN  Visit Count:                                 Cancel/No Show: 1/0    Subjective:  Patient reports today that pain is remaining relatively the same. Reported that he feels minimal change since starting therapy. Pain:  [x] Yes  [] No Location: L Lateral ankle  Pain Rating: (0-10 scale) 2/10  Pain altered Tx:  [] No  [] Yes  Action:    Objective:  Modalities:   Precautions:  Exercises:  Exercise Reps/ Time Weight/ Level Comments Completed today    Schwinn warm up  5'  Seat 3 while collecting subjective data X   (B) heel raise 15 x 2    held today pain    Tandem balance 20\" x 3     x   Tandem balance foam wider base 10\" x 2        3 way hip 10x2  Stance on LLE only  x   BAPS- IV/EV/PF/DF/CW/CCW 10x2 ea Lvl 2      Fitter Board  10x2  Fwd/lat  x   SB stretch neutral/inv  3x30\"   x   Heel taps off step fwd/lat  15x 4'     Seated L ankle AROM  10x2 Yellow   x   SLS 3x20\"  2 finger tip support x   TG heel raise 10x  L10    Seated Heel Raises with Resistance  10x 5# Performed on [employer] hamstring curl machine x   Other: Manual- traction, posterior talar glide, in combination with DF stretching; soft tissue in midcalf region x 10'            Assessment:   [x] Progressing toward goals. Continued to work on improving LLE strength and ROM. Able to perform lateral fitter taps without UE assist today and increased time with static balance exercises for additional challenge.   Added seated calf raises with resistance to additional LE strengthening. Slight increased pain with last 2-3 reps of seated calf raises that subsided with brief rest. Patient continues to have sharp increased pain with calf raises standing. [] No change. [] Other:    [x] Patient would continue to benefit from skilled physical therapy services in order to: improve mobility, normalize strength. STG: (to be met in 8 treatments)  ? Pain: improved pain 0-1/10  ? ROM: Normalized ankle ROM with end range discomfort only  ? Strength: 4+/5 long sitting testing  ? Function: Able to do 6\" stairs in clinic with minimal to no difficulty, improved tolerance of walking up to 20 minutes with minimal to no complaints of pain or instability, improved balance tandem to 10 seconds (B)  Independent with Home Exercise Programs     LTG: (to be met in 12 treatments)  ? Pain: improved pain 0/10  ? ROM: Normalized ankle ROM with NO end range discomfort  ? Strength: 4+/5 long sitting testing  ? Function: Able to do 8\" stairs in clinic with minimal to no difficulty, improved tolerance of walking as needed without complaints of instability; improved tandem balance with foam to 10 seconds (B)  Independent with Home Exercise Programs                    Patient goals: get rid of burning    Pt. Education:  [x] Yes  [] No  [] Reviewed Prior HEP/Ed  Method of Education: [x] Verbal  [] Demo  [] Written  Comprehension of Education:  [x] Verbalizes understanding. [] Demonstrates understanding. [] Needs review. [] Demonstrates/verbalizes HEP/Ed previously given. Plan: [x] Continue per plan of care.    [] Other:      Treatment Charges: Mins Units   []  Modalities     [x]  Ther Exercise 33 2   [x]  Manual Therapy 8 1   []  Ther Activities     []  Aquatics     []  Neuromuscular     [] Vasocompression     [] Gait Training     [] Dry needling        [] 1 or 2 muscles        [] 3 or more muscles     []  Other- CP     Total Treatment time 41 3     Time In: 0478 Time Out: 12:13     Electronically signed by:  Radha Dietz PTA

## 2022-10-14 ENCOUNTER — HOSPITAL ENCOUNTER (OUTPATIENT)
Dept: PHYSICAL THERAPY | Age: 77
Setting detail: THERAPIES SERIES
Discharge: HOME | End: 2022-10-14
Payer: MEDICARE

## 2022-10-14 PROCEDURE — 97140 MANUAL THERAPY 1/> REGIONS: CPT

## 2022-10-14 PROCEDURE — 97110 THERAPEUTIC EXERCISES: CPT

## 2022-10-14 NOTE — FLOWSHEET NOTE
[facility]   [address]   Phone: [phone]   Fax: [phone]    Physical Therapy Daily Treatment Note      Date:  10/14/2022  Patient Name:  Leana Ramos    :  1945  MRN: [id number]  Physician: Ned Bear MD                             Insurance: /[facility] secondary MEDICAL NECESSITY  Medical Diagnosis: sprain of (L) ankle S93.402A                Rehab Codes: (L) ankle pain M25.572  Onset date: referral 22             Next Dr's appt.: PRN  Visit Count:                                 Cancel/No Show: 1/0    Subjective:  Patient reported improved ankle and calf pain earlier in the week, increased today per the patient. Stated he was up and down stairs more as his wife is unable to and this resulted in more soreness. Pain:  [x] Yes  [] No Location: L Lateral ankle  Pain Rating: (0-10 scale) 5/10  Pain altered Tx:  [] No  [] Yes  Action:    Objective:  Modalities:   Precautions:  Exercises:  Exercise Reps/ Time Weight/ Level Comments Completed today    Schwinn warm up  5'  Seat 3 while collecting subjective data X   (B) heel raise 15 x 2    held today pain    Tandem balance 20\" x 3     x   Tandem balance foam wider base 10\" x 2        3 way hip 10x2  Stance on LLE only  x   BAPS- IV/EV/PF/DF/CW/CCW 10x2 ea Lvl 2      Fitter Board  10x2  Fwd/lat  x   SB stretch neutral/inv  3x30\"   x   Heel taps off step fwd/lat  15x 4'     Seated L ankle AROM  10x2 Yellow   x   SLS 3x20\"  2 finger tip support x   TG heel raise 10x  L6 ATTEMPTED TODAY   Seated Heel Raises with Resistance  10x 5# Performed on [employer] hamstring curl machine x   Other: Manual- traction, posterior talar glide, in combination with DF stretching; soft tissue in midcalf region x 10'       Assessment:   [x] Progressing toward goals. Skipped the TG heel  raise today as it was too painful. Still having a lot of midcalf pain and lateral ankle burning.   Ankle symptoms seem to be improving or less at the end of treatment, but midcalf symptoms continue to bother patient with any type of direct loading to the calf, including at low level TG today. [] No change. [] Other:    [x] Patient would continue to benefit from skilled physical therapy services in order to: improve mobility, normalize strength. STG: (to be met in 8 treatments)  ? Pain: improved pain 0-1/10  ? ROM: Normalized ankle ROM with end range discomfort only  ? Strength: 4+/5 long sitting testing  ? Function: Able to do 6\" stairs in clinic with minimal to no difficulty, improved tolerance of walking up to 20 minutes with minimal to no complaints of pain or instability, improved balance tandem to 10 seconds (B)  Independent with Home Exercise Programs     LTG: (to be met in 12 treatments)  ? Pain: improved pain 0/10  ? ROM: Normalized ankle ROM with NO end range discomfort  ? Strength: 4+/5 long sitting testing  ? Function: Able to do 8\" stairs in clinic with minimal to no difficulty, improved tolerance of walking as needed without complaints of instability; improved tandem balance with foam to 10 seconds (B)  Independent with Home Exercise Programs                    Patient goals: get rid of burning    Pt. Education:  [x] Yes  [] No  [] Reviewed Prior HEP/Ed  Method of Education: [x] Verbal  [] Demo  [] Written  Comprehension of Education:  [x] Verbalizes understanding. [] Demonstrates understanding. [] Needs review. [] Demonstrates/verbalizes HEP/Ed previously given. Plan: [x] Continue per plan of care.    [] Other:      Treatment Charges: Mins Units   []  Modalities     [x]  Ther Exercise 35 2   [x]  Manual Therapy 10 1   []  Ther Activities     []  Aquatics     []  Neuromuscular     [] Vasocompression     [] Gait Training     [] Dry needling        [] 1 or 2 muscles        [] 3 or more muscles     []  Other- CP     Total Treatment time 45 3     Time In: 2107       Time Out: 12:15     Electronically signed by:  Mariposa Groves, PT

## 2022-10-19 ENCOUNTER — HOSPITAL ENCOUNTER (OUTPATIENT)
Dept: PHYSICAL THERAPY | Age: 77
Setting detail: THERAPIES SERIES
Discharge: HOME | End: 2022-10-19
Payer: MEDICARE

## 2022-10-19 PROCEDURE — 97140 MANUAL THERAPY 1/> REGIONS: CPT

## 2022-10-19 PROCEDURE — 97110 THERAPEUTIC EXERCISES: CPT

## 2022-10-19 NOTE — FLOWSHEET NOTE
[facility]   [address]   Phone: [phone]   Fax: [phone]    Physical Therapy Daily Treatment Note      Date:  10/19/2022  Patient Name:  Ayaan Bush    :  1945  MRN: [id number]  Physician: Adelina Jeong MD                             Insurance: /[facility] secondary MEDICAL NECESSITY  Medical Diagnosis: sprain of (L) ankle S93.402A                Rehab Codes: (L) ankle pain M25.572  Onset date: referral 22             Next Dr's appt.: PRN  Visit Count:  (corrected visit count today)                                Cancel/No Show: 1/0    Subjective: Patient reports today that recent calf pain is gone. Reported that L ankle continues to feel sore with not much change in pain. Pain:  [x] Yes  [] No Location: L Lateral ankle  Pain Rating: (0-10 scale) 5/10  Pain altered Tx:  [] No  [] Yes  Action:    Objective:  Modalities:   Precautions:  Exercises:  Exercise Reps/ Time Weight/ Level Comments Completed today    Schwinn warm up  5'  Seat 3 while collecting subjective data X   (B) heel raise 15 x 2    held today pain    Tandem balance on foam 30\" x 3     x   Tandem balance foam wider base 10\" x 2        3 way hip 10x2  Stance on LLE only  x   BAPS- IV/EV/PF/DF/CW/CCW 10x2 ea Lvl 2      Fitter Board  10x2  Fwd/lat  x   SB stretch neutral/inv  3x30\"   x   Heel taps off step fwd/lat  15x 4'     Seated L ankle AROM  10x2 Yellow      SLS 3x20\"  2 finger tip support x   TG heel raise 10x  L6 ATTEMPTED TODAY   Seated Heel Raises with Resistance  10x 10# Performed on [employer] hamstring curl machine x   Forward/ Lateral Step Ups onto BOSU 15x ea  Min UE support to maintain balance x   Other: Manual- traction, posterior talar glide, in combination with DF stretching; soft tissue in midcalf region x 10'       Assessment:   [x] Progressing toward goals.  Improvement in ankle DF following manual. Added foam to tandem stance and added step ups onto BOSU to further challenge ankle muscle control. Increased resistance with seated heel raises to increase strengthening potential of exercise. No change in pain with progressions made today. [] No change. [] Other:    [x] Patient would continue to benefit from skilled physical therapy services in order to: improve mobility, normalize strength. STG: (to be met in 8 treatments)  ? Pain: improved pain 0-1/10  ? ROM: Normalized ankle ROM with end range discomfort only  ? Strength: 4+/5 long sitting testing  ? Function: Able to do 6\" stairs in clinic with minimal to no difficulty, improved tolerance of walking up to 20 minutes with minimal to no complaints of pain or instability, improved balance tandem to 10 seconds (B)  Independent with Home Exercise Programs     LTG: (to be met in 12 treatments)  ? Pain: improved pain 0/10  ? ROM: Normalized ankle ROM with NO end range discomfort  ? Strength: 4+/5 long sitting testing  ? Function: Able to do 8\" stairs in clinic with minimal to no difficulty, improved tolerance of walking as needed without complaints of instability; improved tandem balance with foam to 10 seconds (B)  Independent with Home Exercise Programs                    Patient goals: get rid of burning    Pt. Education:  [x] Yes  [] No  [x] Reviewed Prior HEP/Ed  Method of Education: [x] Verbal  [x] Demo  [] Written  Comprehension of Education:  [x] Verbalizes understanding. [x] Demonstrates understanding. [] Needs review. [] Demonstrates/verbalizes HEP/Ed previously given. Plan: [x] Continue per plan of care.    [] Other:      Treatment Charges: Mins Units   []  Modalities     [x]  Ther Exercise 30 2   [x]  Manual Therapy 10 1   []  Ther Activities     []  Aquatics     []  Neuromuscular     [] Vasocompression     [] Gait Training     [] Dry needling        [] 1 or 2 muscles        [] 3 or more muscles     []  Other- CP     Total Treatment time 40 3     Time In: 11:15 am       Time Out: 12:00 pm    Electronically signed by:  Mellissa Malave PTA

## 2022-10-21 ENCOUNTER — HOSPITAL ENCOUNTER (OUTPATIENT)
Dept: PHYSICAL THERAPY | Age: 77
Setting detail: THERAPIES SERIES
Discharge: HOME | End: 2022-10-21
Payer: MEDICARE

## 2022-10-21 PROCEDURE — 97140 MANUAL THERAPY 1/> REGIONS: CPT

## 2022-10-21 PROCEDURE — 97110 THERAPEUTIC EXERCISES: CPT

## 2022-10-21 NOTE — FLOWSHEET NOTE
[facility]   [address] [place] [address] [address]   Phone: [phone]   Fax: [phone]    Physical Therapy Daily Treatment Note      Date:  10/21/2022  Patient Name:  Yin Padilla    :  1945  MRN: [id number]  Physician: Justyna Munoz MD                             Insurance: /[facility] secondary MEDICAL NECESSITY  Medical Diagnosis: sprain of (L) ankle S93.402A                Rehab Codes: (L) ankle pain M25.572  Onset date: referral 22             Next 's appt.: PRN  Visit Count:                            Cancel/No Show: 1/0    Subjective: Patient reports today that he feels no pain in L ankle today. Pain:  [] Yes  [x] No Location: L Lateral ankle  Pain Rating: (0-10 scale) 0/10  Pain altered Tx:  [] No  [] Yes  Action:    Objective:  Modalities:   Precautions:  Exercises:  Exercise Reps/ Time Weight/ Level Comments Completed today    Schwinn warm up  5'  Seat 3 while collecting subjective data X   (B) heel raise 15 x 2    held today pain    Tandem balance on foam 30\" x 3     x   Tandem balance foam wider base 10\" x 2        3 way hip 10x2  Stance on LLE only  x   BAPS- IV/EV/PF/DF/CW/CCW 10x2 ea Lvl 2      Fitter Board  10x2  Fwd/lat  x   SB stretch neutral/inv  3x30\"   x   Heel taps off step fwd/lat  15x 4'     Seated L ankle AROM  10x2 Yellow      SLS 3x20\"  2 finger tip support x   TG heel raise 10x  L6 ATTEMPTED TODAY   Seated Heel Raises with Resistance  10x 10# Performed on [employer] hamstring curl machine x   Forward/ Lateral Step Ups onto BOSU 15x ea  Min UE support to maintain balance x   Other: Manual- traction, posterior talar glide, in combination with DF stretching; soft tissue in midcalf region x 10'       Assessment:   [x] Progressing toward goals. Continued to focus treatment on LLE strength and neuromuscular control. Patient able to performed fitter taps and tandem stance without UE support today.  Intermittent pain reported with WB exercises, especially in single leg stance. Pain decreased in NWB position. [] No change. [] Other:    [x] Patient would continue to benefit from skilled physical therapy services in order to: improve mobility, normalize strength. STG: (to be met in 8 treatments)  ? Pain: improved pain 0-1/10  ? ROM: Normalized ankle ROM with end range discomfort only  ? Strength: 4+/5 long sitting testing  ? Function: Able to do 6\" stairs in clinic with minimal to no difficulty, improved tolerance of walking up to 20 minutes with minimal to no complaints of pain or instability, improved balance tandem to 10 seconds (B)  Independent with Home Exercise Programs     LTG: (to be met in 12 treatments)  ? Pain: improved pain 0/10  ? ROM: Normalized ankle ROM with NO end range discomfort  ? Strength: 4+/5 long sitting testing  ? Function: Able to do 8\" stairs in clinic with minimal to no difficulty, improved tolerance of walking as needed without complaints of instability; improved tandem balance with foam to 10 seconds (B)  Independent with Home Exercise Programs                    Patient goals: get rid of burning    Pt. Education:  [x] Yes  [] No  [x] Reviewed Prior HEP/Ed  Method of Education: [x] Verbal  [x] Demo  [] Written  Comprehension of Education:  [x] Verbalizes understanding. [x] Demonstrates understanding. [] Needs review. [] Demonstrates/verbalizes HEP/Ed previously given. Plan: [x] Continue per plan of care.    [] Other:      Treatment Charges: Mins Units   []  Modalities     [x]  Ther Exercise 28 2   [x]  Manual Therapy 10 1   []  Ther Activities     []  Aquatics     []  Neuromuscular     [] Vasocompression     [] Gait Training     [] Dry needling        [] 1 or 2 muscles        [] 3 or more muscles     []  Other- CP     Total Treatment time 38 3     Time In: 11:32 am       Time Out: 12:10 pm    Electronically signed by:  Leopoldo Medrano PTA

## 2022-10-24 ENCOUNTER — HOSPITAL ENCOUNTER (OUTPATIENT)
Dept: PHYSICAL THERAPY | Age: 77
Setting detail: THERAPIES SERIES
Discharge: HOME | End: 2022-10-24
Payer: MEDICARE

## 2022-10-24 PROCEDURE — 97110 THERAPEUTIC EXERCISES: CPT

## 2022-10-24 PROCEDURE — 97140 MANUAL THERAPY 1/> REGIONS: CPT

## 2022-10-24 NOTE — FLOWSHEET NOTE
[facility]   [address]   Phone: [phone]   Fax: [phone]    Physical Therapy Daily Treatment Note      Date:  10/24/2022  Patient Name:  Yasmin Henriquez    :  1945  MRN: [id number]  Physician: Michael Wright MD                             Insurance: /[facility] secondary MEDICAL NECESSITY  Medical Diagnosis: sprain of (L) ankle S93.402A                Rehab Codes: (L) ankle pain M25.572  Onset date: referral 22             Next Dr's appt.: PRN  Visit Count: 10/12                           Cancel/No Show: 1/0    Subjective: Patient reports today that L ankle and R knee is more sore today for no unknown reason. Pain:  [] Yes  [x] No Location: L Lateral ankle  Pain Rating: (0-10 scale) 0/10  Pain altered Tx:  [] No  [] Yes  Action:    Objective:  Modalities:   Precautions:  Exercises:  Exercise Reps/ Time Weight/ Level Comments Completed today    Schwinn warm up  5'  Seat 3 while collecting subjective data X   (B) heel raise 15 x 2    held today pain    Tandem balance on foam 30\" x 3     x   Tandem balance foam wider base 10\" x 2        3 way hip 10x2  Stance on LLE only  x   BAPS- IV/EV/PF/DF/CW/CCW 10x2 ea Lvl 2      Fitter Board  10x2  Fwd/lat  x   SB stretch neutral/inv  3x30\"   x   Heel taps off step fwd/lat  15x 4'     Seated L ankle AROM  10x2 Yellow      SLS 3x20\"  2 finger tip support x   TG heel raise 10x  L6 ATTEMPTED TODAY   Seated Heel Raises with Resistance  10x 10# Performed on [employer] hamstring curl machine x   Forward/ Lateral Step Ups onto BOSU 15x ea  Min UE support to maintain balance x   March on Cobblefoam 20x ea   x   Other: Manual- traction, posterior talar glide, in combination with DF stretching; soft tissue in midcalf region x 10'       Assessment:   [x] Progressing toward goals. Continued to focus treatment on LLE strength and neuromuscular control.  Added march on Cobblefoam for additional challenge to muscle control/ankle stability. Improved DF demonstrated following manual.            [] No change. [] Other:    [x] Patient would continue to benefit from skilled physical therapy services in order to: improve mobility, normalize strength. STG: (to be met in 8 treatments)  ? Pain: improved pain 0-1/10  ? ROM: Normalized ankle ROM with end range discomfort only  ? Strength: 4+/5 long sitting testing  ? Function: Able to do 6\" stairs in clinic with minimal to no difficulty, improved tolerance of walking up to 20 minutes with minimal to no complaints of pain or instability, improved balance tandem to 10 seconds (B)  Independent with Home Exercise Programs     LTG: (to be met in 12 treatments)  ? Pain: improved pain 0/10  ? ROM: Normalized ankle ROM with NO end range discomfort  ? Strength: 4+/5 long sitting testing  ? Function: Able to do 8\" stairs in clinic with minimal to no difficulty, improved tolerance of walking as needed without complaints of instability; improved tandem balance with foam to 10 seconds (B)  Independent with Home Exercise Programs                    Patient goals: get rid of burning    Pt. Education:  [x] Yes  [] No  [x] Reviewed Prior HEP/Ed  Method of Education: [x] Verbal  [x] Demo  [] Written  Comprehension of Education:  [x] Verbalizes understanding. [x] Demonstrates understanding. [] Needs review. [] Demonstrates/verbalizes HEP/Ed previously given. Plan: [x] Continue per plan of care.    [] Other:      Treatment Charges: Mins Units   []  Modalities     [x]  Ther Exercise 32 2   [x]  Manual Therapy 10 1   []  Ther Activities     []  Aquatics     []  Neuromuscular     [] Vasocompression     [] Gait Training     [] Dry needling        [] 1 or 2 muscles        [] 3 or more muscles     []  Other- CP     Total Treatment time 42 3     Time In: 11:28 am       Time Out: 12:10 pm    Electronically signed by:  Jessica Arora PTA

## 2022-10-27 ENCOUNTER — APPOINTMENT (OUTPATIENT)
Dept: PHYSICAL THERAPY | Age: 77
End: 2022-10-27
Payer: MEDICARE

## 2022-10-28 ENCOUNTER — HOSPITAL ENCOUNTER (OUTPATIENT)
Dept: PHYSICAL THERAPY | Age: 77
Setting detail: THERAPIES SERIES
Discharge: HOME | End: 2022-10-28
Payer: MEDICARE

## 2022-10-28 PROCEDURE — 97112 NEUROMUSCULAR REEDUCATION: CPT

## 2022-10-28 PROCEDURE — 97110 THERAPEUTIC EXERCISES: CPT

## 2022-10-28 NOTE — FLOWSHEET NOTE
[facility]   [address]   Phone: [phone]   Fax: [phone]    Physical Therapy Daily Treatment Note/PROGRESS NOTE      Date:  10/28/2022  Patient Name:  Donnie Day    :  1945  MRN: [id number]  Physician: Avila Dowell MD                             Insurance: /[facility] secondary MEDICAL NECESSITY  Medical Diagnosis: sprain of (L) ankle S93.402A                Rehab Codes: (L) ankle pain M25.572  Onset date: referral 22             Next Dr's appt.: PRN  Visit Count:                            Cancel/No Show: 1/0    Subjective: Patient with improved calf pain but continued complaints of pain in the lateral ankle at this time. Also noting discomfort in the (L) peroneal muscle area as well. Patient had pain today with resisted eversion, plantar flexion, and then with attempting (B) heel raise at this time. No significant pain with ROM testing. Had significant ankle sprain ~6 months ago without resolution of symptoms and has previous history of ankle ligament tear per the patient. We have been working on balance exercises, ankle mobilization with attempts to work on manual calf stretching, posterior talar mobilization with the patient, and then tendon remodeling program/strengthening program of peroneal muscles and calf muscles within tolerance. Patient following up with physician on Monday 10/31- discussed with the patient continuing home program at least and then following physician recommendation regarding any additional testing or intervention such as medication or injection.     Pain:  [] Yes  [x] No Location: L Lateral ankle  Pain Rating: (0-10 scale) 0/10  Pain altered Tx:  [] No  [] Yes  Action:    Objective:  AROM:  DF 8 degrees (B)  PF (R) 60 degrees, (L) 60 degrees lateral ankle joint discomfort  INV (R) 45 degrees, (L) 45 degrees  Eversion (R) 20 degrees, (L) 20 degrees with lateral ankle discomfort Strength:  DF, INV over edge of table (B) 4+/5  PF, EV over edge of table (R) 4+/5, (L) 4/5 with lateral ankle joint discomfort     Able to do (B) heel raise with minimal discomfort  SL heel raise (R) able, (L) unable     Balance:  Tandem 10 seconds (L) behind (R), 10 seconds with sway (R) behind (L)  Tandem foam 8 seconds (L) behind (R), 8 seconds (R) behind (L) WIDER BASE     OBSERVATION No Deficit Deficit Not Tested Comments   Posture           Forward Head []  []  []      Rounded Shoulders []  []  []      Kyphosis []  []  []      Lordosis []  []  []      Lateral Shift []  []  []      Scoliosis []  []  []      Iliac Crest []  []  []      PSIS []  []  []      ASIS []  []  []      Genu Valgus []  []  []      Genu Varus []  []  []      Genu Recurvatum []  []  []      Pronation []  []  []      Supination []  []  []      Leg Length Discrp []  []  []      Slumped Sitting []  []  []      Palpation []  []  []  MOD TTP lateral ankle joint, area of  ATF; into the peroneal muscle area   Sensation []  []  []      Edema []  []  []  Bimalleolar right (R) 23, (L) 22.5cm  Figure 8 girth (R) 51, (L) 50cm  Does not exhibit significant swelling with gross inspection (L).    Neurological []  []  []      Patellar Mobility []  []  []      Patellar Orientation []  []  []      Gait []  []  []  Analysis:            FUNCTION Normal Difficult Unable   Sitting [x]  []  []    Standing [x]  []  []    Ambulation []  [x]  []    Groom/Dress [x]  []  []    Lift/Carry []  [x]  []    Stairs []  [x]  []    Bending []  [x]  []    Squat []  [x]  []    Kneel []  [x]  []      Modalities:   Precautions:  Exercises:  Exercise Reps/ Time Weight/ Level Comments Completed today    Brad warm up  5'  Seat 3 while collecting subjective data X   (B) heel raise 15 x 2    held today pain    Tandem balance on foam 30\" x 3     x   Tandem balance foam wider base 10\" x 2        3 way hip 10x2  Stance on LLE only  x   BAPS- IV/EV/PF/DF/CW/CCW 10x2 ea Lvl 2 Fitter Board  10x2  Fwd/lat  x   SB stretch neutral/inv  3x30\"   x   Heel taps off step fwd/lat  15x 4'     Seated L ankle AROM  10x2 Yellow      SLS 3x20\"  2 finger tip support x   TG heel raise 10x  L6 ATTEMPTED TODAY   Seated Heel Raises with Resistance  10x 10# Performed on [employer] hamstring curl machine x   Forward/ Lateral Step Ups onto BOSU 15x ea  Min UE support to maintain balance x   Marches on Cobblefoam 20x ea   x   Other: Manual- traction, posterior talar glide, in combination with DF stretching; soft tissue in midcalf region x 10'       Assessment: Patient with some improvement with balance testing, generally good ROM testing with mild end range restriction with PF ROM. However, still is limited with heel raise, inability to do SL heel raise, and pain with (B) heel raise in the lateral ankle joint at this time. Limited with resisted testing with pain in the lateral ankle with eversion and PF. Patient presents as possible lateral ankle issue- either peroneal muscle issue or lateral ankle ligament issue- but has not progressed or improved significantly with typical exercise program.  Patient following up with physician on Monday for more guidance Re additional treatment options. [x] Progressing toward goals. [] No change. [] Other:    [x] Patient would continue to benefit from skilled physical therapy services in order to: improve mobility, normalize strength. STG: (to be met in 8 treatments)  ? Pain: improved pain 0-1/10 NOT MET  ? ROM: Normalized ankle ROM with end range discomfort only PROGRESSING  ? Strength: 4+/5 long sitting testing NOT MET  ? Function: Able to do 6\" stairs in clinic with minimal to no difficulty MET, improved tolerance of walking up to 20 minutes with minimal to no complaints of pain or instability PROGRESSING, improved balance tandem to 10 seconds (B) MET  Independent with Home Exercise Programs MET     LTG: (to be met in 12 treatments)  ?  Pain: improved pain 0/10 NOT MET  ? ROM: Normalized ankle ROM with NO end range discomfort PROGRESSING  ? Strength: 4+/5 long sitting testing NOT MET  ? Function: Able to do 8\" stairs in clinic with minimal to no difficulty PROGRESSING, improved tolerance of walking as needed without complaints of instability PROGRESSING; improved tandem balance with foam to 10 seconds (B) MET  Independent with Home Exercise Programs MET                    Patient goals: get rid of burning    Pt. Education:  [x] Yes  [] No  [x] Reviewed Prior HEP/Ed  Method of Education: [x] Verbal  [x] Demo  [] Written  Comprehension of Education:  [x] Verbalizes understanding. [x] Demonstrates understanding. [] Needs review. [] Demonstrates/verbalizes HEP/Ed previously given. Plan: [x] Continue per plan of care.    [] Other:      Treatment Charges: Mins Units   []  Modalities     [x]  Ther Exercise 32 2   [x]  Manual Therapy     []  Ther Activities     []  Aquatics     []  Neuromuscular 10 1   [] Vasocompression     [] Gait Training     [] Dry needling        [] 1 or 2 muscles        [] 3 or more muscles     []  Other- CP     Total Treatment time 42 3     Time In: 11:15 am       Time Out: 12:05 pm    Electronically signed by:  Alejandrina Bonilla, PT

## 2022-10-31 ENCOUNTER — OFFICE VISIT (OUTPATIENT)
Dept: ORTHOPEDIC SURGERY | Age: 77
End: 2022-10-31
Payer: MEDICARE

## 2022-10-31 VITALS — HEIGHT: 67 IN | TEMPERATURE: 97.5 F | BODY MASS INDEX: 24.17 KG/M2 | WEIGHT: 154 LBS

## 2022-10-31 DIAGNOSIS — S93.402D SPRAIN OF LEFT ANKLE, UNSPECIFIED LIGAMENT, SUBSEQUENT ENCOUNTER: Primary | ICD-10-CM

## 2022-10-31 DIAGNOSIS — M19.079 ARTHRITIS OF SUBTALAR JOINT: ICD-10-CM

## 2022-10-31 DIAGNOSIS — S93.402D SPRAIN OF LEFT ANKLE, UNSPECIFIED LIGAMENT, SUBSEQUENT ENCOUNTER: ICD-10-CM

## 2022-10-31 DIAGNOSIS — M19.079 ARTHRITIS OF SUBTALAR JOINT: Primary | ICD-10-CM

## 2022-10-31 PROCEDURE — G8427 DOCREV CUR MEDS BY ELIG CLIN: HCPCS | Performed by: ORTHOPAEDIC SURGERY

## 2022-10-31 PROCEDURE — 1123F ACP DISCUSS/DSCN MKR DOCD: CPT | Performed by: ORTHOPAEDIC SURGERY

## 2022-10-31 PROCEDURE — G8420 CALC BMI NORM PARAMETERS: HCPCS | Performed by: ORTHOPAEDIC SURGERY

## 2022-10-31 PROCEDURE — 99213 OFFICE O/P EST LOW 20 MIN: CPT | Performed by: ORTHOPAEDIC SURGERY

## 2022-10-31 PROCEDURE — 1036F TOBACCO NON-USER: CPT | Performed by: ORTHOPAEDIC SURGERY

## 2022-10-31 PROCEDURE — G8484 FLU IMMUNIZE NO ADMIN: HCPCS | Performed by: ORTHOPAEDIC SURGERY

## 2022-10-31 RX ORDER — LIDOCAINE 4 G/G
PATCH TOPICAL
Qty: 14 PATCH | Refills: 0 | Status: SHIPPED | OUTPATIENT
Start: 2022-10-31

## 2022-10-31 NOTE — PROGRESS NOTES
Grecia Smallwood [facility] 2.  [address] [address]  Dept: [phone]    Ambulatory Orthopedic Consult      CHIEF COMPLAINT:    Chief Complaint   Patient presents with    Ankle Pain     Left       HISTORY OF PRESENT ILLNESS:      The patient is a 68 y.o. male who is being seen for evaluation of the above, which began around 6/12/2022 secondary to a twisting injury. At today's visit, he is using no brace/assistive device. History is obtained today from:   [x]  the patient     [x]  EMR     []  one family member/friend    []  multiple family members/friends    []  other:      He localizes pain to the lateral hindfoot, and denies any repeat ankle injuries. INTERVAL HISTORY 10/31/2022:  He is seen again today in the office for follow up of a previous issue (as above). Since being seen last, the patient is doing about the same overall. At today's visit, he is not using a brace or assistive device. History is obtained today from:   [x]  the patient     []  EMR     []  one family member/friend    []  multiple family members/friends    []  other:      He denies any new injuries. REVIEW OF SYSTEMS:  Musculoskeletal: See HPI for pertinent positives     Past Medical History:    He  has a past medical history of Aortic insufficiency, CAD (coronary artery disease), Chronic kidney disease, Diabetes mellitus ([facility] [address] Frozen shoulder, GERD (gastroesophageal reflux disease), blood clots, Hyperlipidemia, Hypertension, Kidney calculi, Kidney disease, Neuropathy, Obstructive sleep apnea on CPAP, Osteoarthritis, and Wears partial dentures. Past Surgical History:    He  has a past surgical history that includes Tonsillectomy; Rectal surgery; shoulder surgery; Achilles tendon surgery (Left); Cataract removal with implant (Bilateral); Rotator cuff repair (Left); joint replacement (Left); Appendectomy;  Dental surgery; and Coronary artery bypass graft (04/29/2019). Current Medications:     Current Outpatient Medications:     diclofenac sodium (VOLTAREN) 1 % GEL, Apply 4 g topically 4 times daily as needed for Pain, Disp: 200 g, Rfl: 0    solifenacin (VESICARE) 5 MG tablet, Take 5 mg by mouth daily, Disp: , Rfl:     Insulin Glulisine (APIDRA SC), Inject into the skin Sliding scale, Disp: , Rfl:     losartan (COZAAR) 25 MG tablet, Take 1 tablet by mouth daily FOR VACATION SUPPLY, Disp: 30 tablet, Rfl: 0    atorvastatin (LIPITOR) 20 MG tablet, Take 20 mg by mouth daily, Disp: , Rfl:     metoprolol (LOPRESSOR) 100 MG tablet, Take 100 mg by mouth 2 times daily, Disp: , Rfl:     SITagliptin (JANUVIA) 50 MG tablet, Take 50 mg by mouth daily, Disp: , Rfl:     apixaban (ELIQUIS) 2.5 MG TABS tablet, Take by mouth 2 times daily, Disp: , Rfl:     isosorbide mononitrate (IMDUR) 60 MG CR tablet, Take 60 mg by mouth 2 times daily , Disp: , Rfl:     aspirin 81 MG tablet, Take 81 mg by mouth daily. , Disp: , Rfl:     omeprazole (PRILOSEC) 20 MG capsule, Take 20 mg by mouth nightly , Disp: , Rfl:     insulin glargine (LANTUS) 100 UNIT/ML injection, Inject 26 Units into the skin nightly , Disp: , Rfl:      Allergies:    Coumadin [warfarin sodium], Morphine, and Doxycycline    Family History:  family history includes Diabetes in his brother, mother, and sister; Heart Disease in his mother and sister; High Blood Pressure in his mother and sister.     Social History:   Social History     Occupational History    Not on file   Tobacco Use    Smoking status: Never    Smokeless tobacco: Never   Vaping Use    Vaping Use: Never used   Substance and Sexual Activity    Alcohol use: No     Alcohol/week: 0.0 standard drinks    Drug use: No    Sexual activity: Not on file     Retired    OBJECTIVE:  Temp 97.5 °F (36.4 °C)   Ht 5' 6.5\" (1.689 m)   Wt 154 lb (69.9 kg)   BMI 24.48 kg/m²    Psych: awake, alert  Cardio:  well perfused extremities, no cyanosis  Resp:  normal respiratory effort  Musculoskeletal:    Affected lower extremity:    Vascular: Limb well perfused, compartments soft/compressible. Skin: No erythema/ulcers. Intact. Neurovascular Status:  Grossly neurovascularly intact throughout  Motion:  Grossly able to fire major muscle groups with appropriate/expected AROM  Tenderness to Palpation: Sinus Tarsi/CFL greater than ATFL  -Able to ashkan the foot against resistance, painless  --Instability:  Talar tilt: Negative; Anterior drawer: Negative  -No peroneal subluxation/dislocation with dorsiflexion + eversion or with circumduction      RADIOLOGY:   10/31/2022 No new radiology images today. Prior images reviewed for reference. FINDINGS:  Three weightbearing views (AP, Mortise, and Lateral) of the left ankle and three weightbearing views (AP, Oblique, Lateral) of the left foot were obtained in the office today and reviewed, revealing no acute fracture, dislocation, or radioopaque foreign body/tumor. The ankle mortise is maintained with no widening of the clear spaces. Overall alignment is satisfactory. Degenerative changes at the subtalar joint with joint space narrowing, sclerosis, and osteophytes. IMPRESSION:  No acute fracture/dislocation. Electronically signed by Lainey Sanchez MD       ASSESSMENT AND PLAN:  Body mass index is 24.48 kg/m². He has left lateral ankle/foot pain, possibly secondary to an ankle sprain sustained around 6/12/2022. 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 Notably, he has the past medical history as above. He has a history of GERD, coronary artery disease status post CABG, chronic kidney disease, vascular disease with a history of claudication, and type 2 diabetes. We had a discussion today about the likely diagnosis and its natural history, physical exam and imaging findings, as well as various treatment options in detail.     Surgically, we discussed a possible future surgery, depending on the patient's clinical course and imaging as below. Orders/referrals were placed as below at today's visit. He may continue to do his home exercises. He may continue to use his prescribed topical Voltaren gel, but reports that this does not help much. At today's visit, he was ordered topical lidocaine patches. In order to know exactly how to proceed with treatment, an MRI was ordered today to evaluate the subtalar joint, peroneal tendons, and lateral ankle ligamentous complex. This is medically necessary to evaluate the structures in this area, for both diagnosis and treatment. All questions were answered and the above plan was agreed upon. The patient will return to clinic after the MRI has been obtained without x-rays. At his next visit, we will review his MRI, and possibly consider a left subtalar joint injection. At the patient's next visit, depending on how the patient is doing and/or new imaging/labs results, we may consider the following options:    []  Lace up ankle     []  CAM boot         []  removable wrist brace     []  PT:        []  Wean out immobilization         []  Adv activity      []  Footmind        []  Spenco       []  Custom Orthotic:               []  AZ brace                    []  Rocker Bottom      []  Night splint    []  Heel cups        []  Strap        []  Toe gizmos    []  Topl        []  NSAIDs         []  Ayla        []  Ref:         []  Stress Xray    []  CT        []  MRI  []  Inj:          []  Consider OR      []  Pick OR date    No follow-ups on file. No orders of the defined types were placed in this encounter. No orders of the defined types were placed in this encounter. Lenard Berg MD  Orthopedic Surgery        Please excuse any typos/errors, as this note was created with the assistance of voice recognition software.  While intending to generate a document that actually reflects the content of the visit, the document can still have some errors including those of syntax and sound-a-like substitutions which may escape proof reading. In such instances, actual meaning can be extrapolated by context.

## 2022-11-16 ENCOUNTER — HOSPITAL ENCOUNTER (OUTPATIENT)
Dept: MRI IMAGING | Age: 77
Discharge: HOME | End: 2022-11-18
Payer: MEDICARE

## 2022-11-16 DIAGNOSIS — S93.402D SPRAIN OF LEFT ANKLE, UNSPECIFIED LIGAMENT, SUBSEQUENT ENCOUNTER: ICD-10-CM

## 2022-11-16 DIAGNOSIS — M19.079 ARTHRITIS OF SUBTALAR JOINT: ICD-10-CM

## 2022-11-16 PROCEDURE — 73721 MRI JNT OF LWR EXTRE W/O DYE: CPT

## 2022-11-21 ENCOUNTER — OFFICE VISIT (OUTPATIENT)
Dept: ORTHOPEDIC SURGERY | Age: 77
End: 2022-11-21
Payer: MEDICARE

## 2022-11-21 VITALS — BODY MASS INDEX: 24.17 KG/M2 | TEMPERATURE: 97.8 F | HEIGHT: 67 IN | WEIGHT: 154 LBS

## 2022-11-21 DIAGNOSIS — M19.079 ARTHRITIS OF SUBTALAR JOINT: ICD-10-CM

## 2022-11-21 DIAGNOSIS — S93.402D SPRAIN OF LEFT ANKLE, UNSPECIFIED LIGAMENT, SUBSEQUENT ENCOUNTER: Primary | ICD-10-CM

## 2022-11-21 PROCEDURE — 20605 DRAIN/INJ JOINT/BURSA W/O US: CPT | Performed by: ORTHOPAEDIC SURGERY

## 2022-11-21 PROCEDURE — 1123F ACP DISCUSS/DSCN MKR DOCD: CPT | Performed by: ORTHOPAEDIC SURGERY

## 2022-11-21 PROCEDURE — 99213 OFFICE O/P EST LOW 20 MIN: CPT | Performed by: ORTHOPAEDIC SURGERY

## 2022-11-21 PROCEDURE — G8420 CALC BMI NORM PARAMETERS: HCPCS | Performed by: ORTHOPAEDIC SURGERY

## 2022-11-21 PROCEDURE — 1036F TOBACCO NON-USER: CPT | Performed by: ORTHOPAEDIC SURGERY

## 2022-11-21 PROCEDURE — G8427 DOCREV CUR MEDS BY ELIG CLIN: HCPCS | Performed by: ORTHOPAEDIC SURGERY

## 2022-11-21 PROCEDURE — G8484 FLU IMMUNIZE NO ADMIN: HCPCS | Performed by: ORTHOPAEDIC SURGERY

## 2022-11-21 RX ORDER — LIDOCAINE HYDROCHLORIDE 10 MG/ML
2 INJECTION, SOLUTION INFILTRATION; PERINEURAL ONCE
Status: COMPLETED | OUTPATIENT
Start: 2022-11-21 | End: 2022-11-21

## 2022-11-21 RX ORDER — TRIAMCINOLONE ACETONIDE 40 MG/ML
40 INJECTION, SUSPENSION INTRA-ARTICULAR; INTRAMUSCULAR ONCE
Status: COMPLETED | OUTPATIENT
Start: 2022-11-21 | End: 2022-11-21

## 2022-11-21 RX ADMIN — TRIAMCINOLONE ACETONIDE 40 MG: 40 INJECTION, SUSPENSION INTRA-ARTICULAR; INTRAMUSCULAR at 10:23

## 2022-11-21 RX ADMIN — LIDOCAINE HYDROCHLORIDE 2 ML: 10 INJECTION, SOLUTION INFILTRATION; PERINEURAL at 10:23

## 2022-11-21 NOTE — PROGRESS NOTES
Grecia Smallwood [facility] 2.  [address] [address]  Dept: [phone]    Ambulatory Orthopedic Consult      CHIEF COMPLAINT:    Chief Complaint   Patient presents with    Ankle Pain     Left         HISTORY OF PRESENT ILLNESS:      The patient is a 68 y.o. male who is being seen for evaluation of the above, which began around 6/12/2022 secondary to a twisting injury. At today's visit, he is using no brace/assistive device. History is obtained today from:   [x]  the patient     [x]  EMR     []  one family member/friend    []  multiple family members/friends    []  other:      He localizes pain to the lateral hindfoot, and denies any repeat ankle injuries. INTERVAL HISTORY 10/31/2022:  He is seen again today in the office for follow up of a previous issue (as above). Since being seen last, the patient is doing about the same overall. At today's visit, he is not using a brace or assistive device. History is obtained today from:   [x]  the patient     []  EMR     []  one family member/friend    []  multiple family members/friends    []  other:      He denies any new injuries. INTERVAL HISTORY 11/21/2022:  He is seen again today in the office for follow up of imaging as below. Since being seen last, the patient is doing about the same overall. At today's visit, he is using no brace/assistive device.     History is obtained today from:   [x]  the patient     [x]  EMR     []  one family member/friend    []  multiple family members/friends    []  other:        REVIEW OF SYSTEMS:  Musculoskeletal: See HPI for pertinent positives     Past Medical History:    He  has a past medical history of Aortic insufficiency, CAD (coronary artery disease), Chronic kidney disease, [facility] Frozen shoulder, GERD (gastroesophageal reflux disease), blood clots, Hyperlipidemia, Hypertension, Kidney calculi, Kidney disease, Neuropathy, Obstructive sleep apnea on CPAP, Osteoarthritis, and Wears partial dentures. Past Surgical History:    He  has a past surgical history that includes Tonsillectomy; Rectal surgery; shoulder surgery; Achilles tendon surgery (Left); Cataract removal with implant (Bilateral); Rotator cuff repair (Left); joint replacement (Left); Appendectomy; Dental surgery; and Coronary artery bypass graft (04/29/2019). Current Medications:     Current Outpatient Medications:     lidocaine 4 % external patch, Apply to affected area; leave in place no longer than 12 hrs then remove the patch; use no longer than 12 hrs/day total, Disp: 14 patch, Rfl: 0    diclofenac sodium (VOLTAREN) 1 % GEL, Apply 4 g topically 4 times daily as needed for Pain, Disp: 200 g, Rfl: 0    solifenacin (VESICARE) 5 MG tablet, Take 5 mg by mouth daily, Disp: , Rfl:     Insulin Glulisine (APIDRA SC), Inject into the skin Sliding scale, Disp: , Rfl:     losartan (COZAAR) 25 MG tablet, Take 1 tablet by mouth daily FOR VACATION SUPPLY, Disp: 30 tablet, Rfl: 0    atorvastatin (LIPITOR) 20 MG tablet, Take 20 mg by mouth daily, Disp: , Rfl:     metoprolol (LOPRESSOR) 100 MG tablet, Take 100 mg by mouth 2 times daily, Disp: , Rfl:     SITagliptin (JANUVIA) 50 MG tablet, Take 50 mg by mouth daily, Disp: , Rfl:     apixaban (ELIQUIS) 2.5 MG TABS tablet, Take by mouth 2 times daily, Disp: , Rfl:     isosorbide mononitrate (IMDUR) 60 MG CR tablet, Take 60 mg by mouth 2 times daily , Disp: , Rfl:     aspirin 81 MG tablet, Take 81 mg by mouth daily. , Disp: , Rfl:     omeprazole (PRILOSEC) 20 MG capsule, Take 20 mg by mouth nightly , Disp: , Rfl:     insulin glargine (LANTUS) 100 UNIT/ML injection, Inject 26 Units into the skin nightly , Disp: , Rfl:      Allergies:    Coumadin [warfarin sodium], Morphine, and Doxycycline    Family History:  family history includes Diabetes in his brother, mother, and sister;  Heart Disease in his mother and sister; High Blood Pressure in his mother and sister. Social History:   Social History     Occupational History    Not on file   Tobacco Use    Smoking status: Never    Smokeless tobacco: Never   Vaping Use    Vaping Use: Never used   Substance and Sexual Activity    Alcohol use: No     Alcohol/week: 0.0 standard drinks    Drug use: No    Sexual activity: Not on file     Retired    OBJECTIVE:  Temp 97.8 °F (36.6 °C)   Ht 5' 6.5\" (1.689 m)   Wt 154 lb (69.9 kg)   BMI 24.48 kg/m²    Psych: awake, alert  Cardio:  well perfused extremities, no cyanosis  Resp:  normal respiratory effort  Musculoskeletal:    Affected lower extremity:    Vascular: Limb well perfused, compartments soft/compressible. Skin: No erythema/ulcers. Intact. Neurovascular Status:  Grossly neurovascularly intact throughout  Motion:  Grossly able to fire major muscle groups with appropriate/expected AROM  Tenderness to Palpation: Sinus Tarsi/CFL greater than ATFL  -Able to ashkan the foot against resistance, painless  --Instability:  Talar tilt: Negative; Anterior drawer: Negative  -No peroneal subluxation/dislocation with dorsiflexion + eversion or with circumduction      RADIOLOGY:   11/21/2022 Prior images reviewed for reference. MRI images and radiology report reviewed, as below:    Chronic low-grade sprain of the anterior talofibular ligament. Chronic low-grade sprain of the deltoid ligament complex. Moderate-to-severe Achilles tendinosis. Minimal paratenonitis. Mild-to-moderate chronic plantar fasciitis. Mild degenerative changes to the mid and hindfoot. FINDINGS:  Three weightbearing views (AP, Mortise, and Lateral) of the left ankle and three weightbearing views (AP, Oblique, Lateral) of the left foot were obtained in the office today and reviewed, revealing no acute fracture, dislocation, or radioopaque foreign body/tumor. The ankle mortise is maintained with no widening of the clear spaces. Overall alignment is satisfactory. Degenerative changes at the subtalar joint with joint space narrowing, sclerosis, and osteophytes. IMPRESSION:  No acute fracture/dislocation. Electronically signed by Kait Beatty MD       LABS:   Lab Results   Component Value Date    LABA1C 6.3 (H) 10/30/2018     Lab Results   Component Value Date     10/30/2018         ASSESSMENT AND PLAN:  Body mass index is 24.48 kg/m². He has left lateral ankle/foot pain, possibly secondary to an ankle sprain sustained around 6/12/2022, along with a component of subtalar joint arthritis. Notably, he has the past medical history as above. He has a history of GERD, coronary artery disease status post CABG, chronic kidney disease, vascular disease with a history of claudication, and type 2 diabetes. We had a discussion today about the likely diagnosis and its natural history, physical exam and imaging findings, as well as various treatment options in detail. Surgically, we again discussed a possible future surgery (left lateral ankle ligamentous stabilization versus subtalar joint fusion), depending on the patient's clinical course. At today's visit, we did decide to proceed with conservative management. Orders/referrals were placed as below at today's visit. He may continue to do his home exercises. He may continue to use his prescribed topical Voltaren gel (reports that this does not help much), as well as his prescribed topical lidocaine patches. I encouraged him to use his lace up ankle brace. -After discussing his treatment options, he wished to proceed with a left subtalar joint injection as below. All questions were answered and the above plan was agreed upon. The patient will return to clinic in 3 months as needed. At his next visit, we may consider a repeat left subtalar joint injection, custom brace, possible orthotics, and/or surgery.       SUBTALAR INJECTION PROCEDURE NOTE: After discussing the risks/benefits/alternatives to injection, an informed consent was obtained. The left subtalar joint was verified as the correct location and allergies were reviewed. The skin overlying the injection site was cleaned with an alcohol swab followed by a local sterile prep. A 25 gauge needle was introduced into the above location under sterile conditions. A mixture of 40 mg of Kenalog and 2 mL of 1% Lidocaine without epinephrine was injected. The patient was noted to tolerate the procedure well without immediate complication. A dressing was applied and verbal instruction/education was provided. At the patient's next visit, depending on how the patient is doing and/or new imaging/labs results, we may consider the following options:    []  Lace up ankle     []  CAM boot         []  removable wrist brace     []  PT:        []  Wean out immobilization         []  Adv activity      []  Footmind        []  Spenco       []  Custom Orthotic:               []  AZ brace                    []  Rocker Bottom      []  Night splint    []  Heel cups        []  Strap        []  Toe gizmos    []  Topl        []  NSAIDs         []  Ayla        []  Ref:         []  Stress Xray    []  CT        []  MRI  []  Inj:          []  Consider OR      []  Pick OR date    No follow-ups on file. No orders of the defined types were placed in this encounter. No orders of the defined types were placed in this encounter. Arsalan Baxter MD  Orthopedic Surgery        Please excuse any typos/errors, as this note was created with the assistance of voice recognition software. While intending to generate a document that actually reflects the content of the visit, the document can still have some errors including those of syntax and sound-a-like substitutions which may escape proof reading. In such instances, actual meaning can be extrapolated by context.

## 2023-04-24 ENCOUNTER — OFFICE VISIT (OUTPATIENT)
Dept: ORTHOPEDIC SURGERY | Age: 78
End: 2023-04-24
Payer: MEDICARE

## 2023-04-24 VITALS — WEIGHT: 143 LBS | RESPIRATION RATE: 16 BRPM | BODY MASS INDEX: 22.44 KG/M2 | HEIGHT: 67 IN | OXYGEN SATURATION: 100 %

## 2023-04-24 DIAGNOSIS — M19.079 ARTHRITIS OF SUBTALAR JOINT: Primary | ICD-10-CM

## 2023-04-24 PROCEDURE — G8427 DOCREV CUR MEDS BY ELIG CLIN: HCPCS | Performed by: ORTHOPAEDIC SURGERY

## 2023-04-24 PROCEDURE — 1123F ACP DISCUSS/DSCN MKR DOCD: CPT | Performed by: ORTHOPAEDIC SURGERY

## 2023-04-24 PROCEDURE — 20605 DRAIN/INJ JOINT/BURSA W/O US: CPT | Performed by: ORTHOPAEDIC SURGERY

## 2023-04-24 PROCEDURE — 99213 OFFICE O/P EST LOW 20 MIN: CPT | Performed by: ORTHOPAEDIC SURGERY

## 2023-04-24 PROCEDURE — G8420 CALC BMI NORM PARAMETERS: HCPCS | Performed by: ORTHOPAEDIC SURGERY

## 2023-04-24 PROCEDURE — 1036F TOBACCO NON-USER: CPT | Performed by: ORTHOPAEDIC SURGERY

## 2023-04-24 RX ORDER — LIDOCAINE HYDROCHLORIDE 10 MG/ML
2 INJECTION, SOLUTION INFILTRATION; PERINEURAL ONCE
Status: COMPLETED | OUTPATIENT
Start: 2023-04-24 | End: 2023-04-24

## 2023-04-24 RX ORDER — TRIAMCINOLONE ACETONIDE 40 MG/ML
40 INJECTION, SUSPENSION INTRA-ARTICULAR; INTRAMUSCULAR ONCE
Status: COMPLETED | OUTPATIENT
Start: 2023-04-24 | End: 2023-04-24

## 2023-04-24 RX ADMIN — LIDOCAINE HYDROCHLORIDE 2 ML: 10 INJECTION, SOLUTION INFILTRATION; PERINEURAL at 09:00

## 2023-04-24 RX ADMIN — TRIAMCINOLONE ACETONIDE 40 MG: 40 INJECTION, SUSPENSION INTRA-ARTICULAR; INTRAMUSCULAR at 09:00

## 2023-04-24 NOTE — PROGRESS NOTES
[]  Rocker Bottom      []  Night splint    []  Heel cups        []  Strap        []  Toe gizmos    []  Topl        []  NSAIDs         []  Ayla        []  Ref:         []  Stress Xray    []  CT        []  MRI  []  Inj:          []  Consider OR      []  Pick OR date    No follow-ups on file. No orders of the defined types were placed in this encounter. No orders of the defined types were placed in this encounter. Tragn Farah MD  Orthopedic Surgery        Please excuse any typos/errors, as this note was created with the assistance of voice recognition software. While intending to generate a document that actually reflects the content of the visit, the document can still have some errors including those of syntax and sound-a-like substitutions which may escape proof reading. In such instances, actual meaning can be extrapolated by context.

## 2024-01-17 ENCOUNTER — OFFICE VISIT (OUTPATIENT)
Dept: ORTHOPEDIC SURGERY | Age: 79
End: 2024-01-17
Payer: MEDICARE

## 2024-01-17 VITALS — WEIGHT: 145 LBS | BODY MASS INDEX: 23.3 KG/M2 | HEIGHT: 66 IN | RESPIRATION RATE: 14 BRPM

## 2024-01-17 DIAGNOSIS — M17.11 PRIMARY OSTEOARTHRITIS OF RIGHT KNEE: ICD-10-CM

## 2024-01-17 DIAGNOSIS — M70.61 GREATER TROCHANTERIC BURSITIS OF RIGHT HIP: Primary | ICD-10-CM

## 2024-01-17 DIAGNOSIS — M16.0 BILATERAL PRIMARY OSTEOARTHRITIS OF HIP: ICD-10-CM

## 2024-01-17 PROCEDURE — G8427 DOCREV CUR MEDS BY ELIG CLIN: HCPCS | Performed by: PHYSICIAN ASSISTANT

## 2024-01-17 PROCEDURE — G8420 CALC BMI NORM PARAMETERS: HCPCS | Performed by: PHYSICIAN ASSISTANT

## 2024-01-17 PROCEDURE — 1036F TOBACCO NON-USER: CPT | Performed by: PHYSICIAN ASSISTANT

## 2024-01-17 PROCEDURE — 1123F ACP DISCUSS/DSCN MKR DOCD: CPT | Performed by: PHYSICIAN ASSISTANT

## 2024-01-17 PROCEDURE — G8484 FLU IMMUNIZE NO ADMIN: HCPCS | Performed by: PHYSICIAN ASSISTANT

## 2024-01-17 PROCEDURE — 99213 OFFICE O/P EST LOW 20 MIN: CPT | Performed by: PHYSICIAN ASSISTANT

## 2024-01-17 NOTE — PROGRESS NOTES
[facility]                Juan M Calvert PA-C            [address], [address]               [place], [place] [address]           Dept Phone: [phone]           Dept Fax:  [phone] [address]                       [address]           [place], [place] [address]          Dept Phone: [phone]           Dept Fax:  [phone]      Chief Compliant:  Chief Complaint   Patient presents with    Hip Pain     F/U: R Hip    Knee Pain     F/U: R Knee        History of Present Illness:  This is a 78 y.o. male who presents to the clinic today for evaluation of had concerns including Hip Pain (F/U: R Hip) and Knee Pain (F/U: R Knee).     Mr. Tovar is a 78-year-old gentleman who returns today for reevaluation of right leg pain.  Patient was seen by me on 12/20/2023 reporting right leg pain.  Prior to that appointment did have x-rays of both hips and knees revealing moderate osteoarthrosis but his presentation at that time was most consistent with extra-articular pain likely secondary to trochanteric bursitis and underwent a bursal injection at that time.    Patient reports he actually is doing much better today feels that he had near resolution of pain for about 3 weeks does note some occasional discomfort but rates it as a 2/10 and notes no pain in the knee.  Majority of his pain is actually in the posterior buttock has no significant pain over the lateral hip or leg.  Denies any low back pain no numbness or tingling       Past History:    Current Outpatient Medications:     Lactobacillus (PROBIOTIC ACIDOPHILUS PO), Take by mouth daily, Disp: , Rfl:     Multiple Vitamins-Minerals (CENTRUM MEN) TABS, Take 1 tablet by mouth daily, Disp: , Rfl:     lipase-protease-amylase (CREON) 94611-513064 units CPEP delayed release capsule, Take 2 capsules by mouth daily, Disp: , Rfl:     tolterodine (DETROL LA) 4 MG extended release capsule, Take 1 capsule by mouth daily,

## 2024-01-22 ENCOUNTER — OFFICE VISIT (OUTPATIENT)
Dept: ORTHOPEDIC SURGERY | Age: 79
End: 2024-01-22

## 2024-01-22 VITALS — RESPIRATION RATE: 14 BRPM | WEIGHT: 145 LBS | HEIGHT: 66 IN | BODY MASS INDEX: 23.3 KG/M2

## 2024-01-22 DIAGNOSIS — M75.102 LEFT ROTATOR CUFF TEAR ARTHROPATHY: Primary | ICD-10-CM

## 2024-01-22 DIAGNOSIS — M25.512 LEFT SHOULDER PAIN, UNSPECIFIED CHRONICITY: ICD-10-CM

## 2024-01-22 DIAGNOSIS — M12.812 LEFT ROTATOR CUFF TEAR ARTHROPATHY: Primary | ICD-10-CM

## 2024-01-22 RX ORDER — TRIAMCINOLONE ACETONIDE 40 MG/ML
40 INJECTION, SUSPENSION INTRA-ARTICULAR; INTRAMUSCULAR ONCE
Status: COMPLETED | OUTPATIENT
Start: 2024-01-22 | End: 2024-01-22

## 2024-01-22 RX ORDER — LIDOCAINE HYDROCHLORIDE 10 MG/ML
3 INJECTION, SOLUTION INFILTRATION; PERINEURAL ONCE
Status: COMPLETED | OUTPATIENT
Start: 2024-01-22 | End: 2024-01-22

## 2024-01-22 RX ADMIN — LIDOCAINE HYDROCHLORIDE 3 ML: 10 INJECTION, SOLUTION INFILTRATION; PERINEURAL at 09:45

## 2024-01-22 RX ADMIN — TRIAMCINOLONE ACETONIDE 40 MG: 40 INJECTION, SUSPENSION INTRA-ARTICULAR; INTRAMUSCULAR at 09:48

## 2024-01-22 NOTE — PROGRESS NOTES
humeral head migration.  Significant osteophytic change noted at the level of the acromioclavicular joint.  No obvious fracture or dislocation.  Impression: Left shoulder radiographs with changes consistent with rotator cuff tear arthropathy.  Moderate to severe degeneration also noted at the acromioclavicular joint as outlined above.    Impression/Plan:     Jose M Tovar is a 78 y.o. old male with left shoulder pain due to rotator cuff arthropathy.  I did have a discussion with the patient today educating him about the condition of his shoulder and discussed treatment options available to him including nonoperative and operative intervention.  I did recommend proceeding conservatively at this time with physical therapy and a cortisone injection or prescription anti-inflammatory.  He elected for the injection which was administered as outlined below and a prescription for physical therapy was also provided.  I will see him back in my clinic as needed but he was encouraged to return or call at anytime with persistent or worsening symptoms or with any questions or concerns.    Procedure: left shoulder subacromial space injection  Following an appropriate discussion with the patient regarding the risks and benefits of the procedure he consented to proceed. his left shoulder was prepped using chlorhexadine solution. Using aseptic technique and through a posterior approach, his left shoulder subacromial space was injected with a 4 cc mixture of 1cc 40mg/ml kenalog and 3 cc of 1% lidocaine without epinephrine. A band aid was applied to the injection site. he tolerated the injection with no immediate adverse reactions.     This note is created with the assistance of a speech recognition program.  While intending to generate adocument that actually reflects the content of the visit, the document can still have some errors including those of syntax and sound a like substitutions which may escape proof reading.  It such

## 2024-03-20 ENCOUNTER — OFFICE VISIT (OUTPATIENT)
Dept: ORTHOPEDIC SURGERY | Age: 79
End: 2024-03-20
Payer: MEDICARE

## 2024-03-20 VITALS — BODY MASS INDEX: 23.14 KG/M2 | HEIGHT: 66 IN | RESPIRATION RATE: 16 BRPM | WEIGHT: 144 LBS

## 2024-03-20 DIAGNOSIS — M16.0 BILATERAL PRIMARY OSTEOARTHRITIS OF HIP: ICD-10-CM

## 2024-03-20 DIAGNOSIS — M70.61 GREATER TROCHANTERIC BURSITIS OF RIGHT HIP: Primary | ICD-10-CM

## 2024-03-20 PROCEDURE — 1123F ACP DISCUSS/DSCN MKR DOCD: CPT | Performed by: PHYSICIAN ASSISTANT

## 2024-03-20 PROCEDURE — G8484 FLU IMMUNIZE NO ADMIN: HCPCS | Performed by: PHYSICIAN ASSISTANT

## 2024-03-20 PROCEDURE — 1036F TOBACCO NON-USER: CPT | Performed by: PHYSICIAN ASSISTANT

## 2024-03-20 PROCEDURE — G8420 CALC BMI NORM PARAMETERS: HCPCS | Performed by: PHYSICIAN ASSISTANT

## 2024-03-20 PROCEDURE — 99213 OFFICE O/P EST LOW 20 MIN: CPT | Performed by: PHYSICIAN ASSISTANT

## 2024-03-20 PROCEDURE — G8427 DOCREV CUR MEDS BY ELIG CLIN: HCPCS | Performed by: PHYSICIAN ASSISTANT

## 2024-03-20 PROCEDURE — 20610 DRAIN/INJ JOINT/BURSA W/O US: CPT | Performed by: PHYSICIAN ASSISTANT

## 2024-03-20 RX ORDER — BUPIVACAINE HYDROCHLORIDE 2.5 MG/ML
2 INJECTION, SOLUTION INFILTRATION; PERINEURAL ONCE
Status: COMPLETED | OUTPATIENT
Start: 2024-03-20 | End: 2024-03-20

## 2024-03-20 RX ORDER — BETAMETHASONE SODIUM PHOSPHATE AND BETAMETHASONE ACETATE 3; 3 MG/ML; MG/ML
12 INJECTION, SUSPENSION INTRA-ARTICULAR; INTRALESIONAL; INTRAMUSCULAR; SOFT TISSUE ONCE
Status: COMPLETED | OUTPATIENT
Start: 2024-03-20 | End: 2024-03-20

## 2024-03-20 RX ORDER — LIDOCAINE HYDROCHLORIDE 10 MG/ML
2 INJECTION, SOLUTION INFILTRATION; PERINEURAL ONCE
Status: COMPLETED | OUTPATIENT
Start: 2024-03-20 | End: 2024-03-20

## 2024-03-20 RX ADMIN — BETAMETHASONE SODIUM PHOSPHATE AND BETAMETHASONE ACETATE 12 MG: 3; 3 INJECTION, SUSPENSION INTRA-ARTICULAR; INTRALESIONAL; INTRAMUSCULAR; SOFT TISSUE at 10:18

## 2024-03-20 RX ADMIN — BUPIVACAINE HYDROCHLORIDE 5 MG: 2.5 INJECTION, SOLUTION INFILTRATION; PERINEURAL at 10:16

## 2024-03-20 RX ADMIN — LIDOCAINE HYDROCHLORIDE 2 ML: 10 INJECTION, SOLUTION INFILTRATION; PERINEURAL at 10:17

## 2024-03-20 RX ADMIN — BETAMETHASONE SODIUM PHOSPHATE AND BETAMETHASONE ACETATE 12 MG: 3; 3 INJECTION, SUSPENSION INTRA-ARTICULAR; INTRALESIONAL; INTRAMUSCULAR; SOFT TISSUE at 10:15

## 2024-03-20 NOTE — PROGRESS NOTES
[facility]                Juan M Calvert PA-C            [address], [address]               [place], [place] [address]           Dept Phone: [phone]           Dept Fax:  [phone] [address] [facility]                       [address]           [place], [place] [address]          Dept Phone: [phone]           Dept Fax:  [phone]      Chief Compliant:  Chief Complaint   Patient presents with    Hip Pain    Knee Pain     Clifton hips and rt knee        History of Present Illness:  This is a 78 y.o. male who presents to the clinic today for evaluation of had concerns including Hip Pain and Knee Pain (Clifton hips and rt knee).     Mr. Tovar is a pleasant 78-year-old gentleman who presents for reevaluation of right hip and bilateral knee pain.  Patient reports his knees are actually doing very well and states he just started having some mild pain in the right hip about 2 weeks ago.  Pain is most severe to the lateral aspect of the right hip and really seems to only be present with repetitive stairs which she often do when doing laundry.  He states pain is present to the lateral and posterior hip usually 1-2/10 at rest but can get up to about a 5/10 with repetitive activity and stairs.    Patient did undergo a trochanteric bursitis injection on 12/20/2024 which provided significant relief of pain seems to last until just a couple weeks ago       Past History:    Current Outpatient Medications:     Lactobacillus (PROBIOTIC ACIDOPHILUS PO), Take by mouth daily, Disp: , Rfl:     Multiple Vitamins-Minerals (CENTRUM MEN) TABS, Take 1 tablet by mouth daily, Disp: , Rfl:     lipase-protease-amylase (CREON) 54665-590809 units CPEP delayed release capsule, Take 2 capsules by mouth daily, Disp: , Rfl:     tolterodine (DETROL LA) 4 MG extended release capsule, Take 1 capsule by mouth daily, Disp: , Rfl:     insulin lispro (HUMALOG) 100 UNIT/ML SOLN injection vial, Inject

## 2024-05-09 ENCOUNTER — APPOINTMENT (OUTPATIENT)
Dept: CT IMAGING | Age: 79
End: 2024-05-09
Payer: MEDICARE

## 2024-05-09 ENCOUNTER — APPOINTMENT (OUTPATIENT)
Dept: GENERAL RADIOLOGY | Age: 79
End: 2024-05-09
Payer: MEDICARE

## 2024-05-09 ENCOUNTER — HOSPITAL ENCOUNTER (INPATIENT)
Age: 79
LOS: 1 days | Discharge: HOME | End: 2024-05-11
Attending: STUDENT IN AN ORGANIZED HEALTH CARE EDUCATION/TRAINING PROGRAM | Admitting: INTERNAL MEDICINE
Payer: MEDICARE

## 2024-05-09 DIAGNOSIS — R11.2 NAUSEA AND VOMITING, UNSPECIFIED VOMITING TYPE: Primary | ICD-10-CM

## 2024-05-09 DIAGNOSIS — D64.9 ANEMIA, UNSPECIFIED TYPE: ICD-10-CM

## 2024-05-09 DIAGNOSIS — R55 SYNCOPE, UNSPECIFIED SYNCOPE TYPE: ICD-10-CM

## 2024-05-09 DIAGNOSIS — R55 NEAR SYNCOPE: ICD-10-CM

## 2024-05-09 DIAGNOSIS — D50.8 IRON DEFICIENCY ANEMIA SECONDARY TO INADEQUATE DIETARY IRON INTAKE: ICD-10-CM

## 2024-05-09 LAB
ALBUMIN SERPL-MCNC: 3.5 G/DL (ref 3.5–5.2)
ALP SERPL-CCNC: 47 U/L (ref 40–129)
ALT SERPL-CCNC: 38 U/L (ref 5–41)
ANION GAP SERPL CALCULATED.3IONS-SCNC: 10 MMOL/L (ref 9–17)
AST SERPL-CCNC: 21 U/L
BASOPHILS # BLD: 0 K/UL (ref 0–0.2)
BASOPHILS NFR BLD: 0 % (ref 0–2)
BILIRUB DIRECT SERPL-MCNC: 0.1 MG/DL
BILIRUB INDIRECT SERPL-MCNC: 0.2 MG/DL (ref 0–1)
BILIRUB SERPL-MCNC: 0.3 MG/DL (ref 0.3–1.2)
BNP SERPL-MCNC: 2515 PG/ML
BUN SERPL-MCNC: 34 MG/DL (ref 8–23)
CALCIUM SERPL-MCNC: 8.8 MG/DL (ref 8.6–10.4)
CHLORIDE SERPL-SCNC: 101 MMOL/L (ref 98–107)
CO2 SERPL-SCNC: 25 MMOL/L (ref 20–31)
CREAT SERPL-MCNC: 1.3 MG/DL (ref 0.7–1.2)
EOSINOPHIL # BLD: 0 K/UL (ref 0–0.4)
EOSINOPHILS RELATIVE PERCENT: 0 % (ref 0–4)
ERYTHROCYTE [DISTWIDTH] IN BLOOD BY AUTOMATED COUNT: 18 % (ref 11.5–14.9)
GFR, ESTIMATED: 56 ML/MIN/1.73M2
GLUCOSE SERPL-MCNC: 145 MG/DL (ref 70–99)
HCT VFR BLD AUTO: 27.9 % (ref 41–53)
HGB BLD-MCNC: 8.8 G/DL (ref 13.5–17.5)
LACTATE BLDV-SCNC: 1.6 MMOL/L (ref 0.5–2.2)
LIPASE SERPL-CCNC: 8 U/L (ref 13–60)
LYMPHOCYTES NFR BLD: 0.5 K/UL (ref 1–4.8)
LYMPHOCYTES RELATIVE PERCENT: 5 % (ref 24–44)
MCH RBC QN AUTO: 23.9 PG (ref 26–34)
MCHC RBC AUTO-ENTMCNC: 31.5 G/DL (ref 31–37)
MCV RBC AUTO: 76 FL (ref 80–100)
MONOCYTES NFR BLD: 0.7 K/UL (ref 0.1–1.3)
MONOCYTES NFR BLD: 7 % (ref 1–7)
NEUTROPHILS NFR BLD: 88 % (ref 36–66)
NEUTS SEG NFR BLD: 8.8 K/UL (ref 1.3–9.1)
PLATELET # BLD AUTO: 109 K/UL (ref 150–450)
PMV BLD AUTO: 9.8 FL (ref 6–12)
POTASSIUM SERPL-SCNC: 4.2 MMOL/L (ref 3.7–5.3)
PROT SERPL-MCNC: 5.8 G/DL (ref 6.4–8.3)
RBC # BLD AUTO: 3.67 M/UL (ref 4.5–5.9)
SODIUM SERPL-SCNC: 136 MMOL/L (ref 135–144)
TROPONIN I SERPL HS-MCNC: 15 NG/L (ref 0–22)
WBC OTHER # BLD: 9.9 K/UL (ref 3.5–11)

## 2024-05-09 PROCEDURE — 74177 CT ABD & PELVIS W/CONTRAST: CPT

## 2024-05-09 PROCEDURE — 36415 COLL VENOUS BLD VENIPUNCTURE: CPT

## 2024-05-09 PROCEDURE — 96360 HYDRATION IV INFUSION INIT: CPT

## 2024-05-09 PROCEDURE — 6370000000 HC RX 637 (ALT 250 FOR IP)

## 2024-05-09 PROCEDURE — 84484 ASSAY OF TROPONIN QUANT: CPT

## 2024-05-09 PROCEDURE — 93005 ELECTROCARDIOGRAM TRACING: CPT

## 2024-05-09 PROCEDURE — 83880 ASSAY OF NATRIURETIC PEPTIDE: CPT

## 2024-05-09 PROCEDURE — 80048 BASIC METABOLIC PNL TOTAL CA: CPT

## 2024-05-09 PROCEDURE — 71045 X-RAY EXAM CHEST 1 VIEW: CPT

## 2024-05-09 PROCEDURE — 83690 ASSAY OF LIPASE: CPT

## 2024-05-09 PROCEDURE — 83605 ASSAY OF LACTIC ACID: CPT

## 2024-05-09 PROCEDURE — 72125 CT NECK SPINE W/O DYE: CPT

## 2024-05-09 PROCEDURE — 85025 COMPLETE CBC W/AUTO DIFF WBC: CPT

## 2024-05-09 PROCEDURE — 87040 BLOOD CULTURE FOR BACTERIA: CPT

## 2024-05-09 PROCEDURE — 80076 HEPATIC FUNCTION PANEL: CPT

## 2024-05-09 PROCEDURE — 70450 CT HEAD/BRAIN W/O DYE: CPT

## 2024-05-09 PROCEDURE — 99285 EMERGENCY DEPT VISIT HI MDM: CPT

## 2024-05-09 RX ORDER — SODIUM CHLORIDE 0.9 % (FLUSH) 0.9 %
10 SYRINGE (ML) INJECTION ONCE
Status: COMPLETED | OUTPATIENT
Start: 2024-05-10 | End: 2024-05-10

## 2024-05-09 RX ORDER — 0.9 % SODIUM CHLORIDE 0.9 %
100 INTRAVENOUS SOLUTION INTRAVENOUS ONCE
Status: COMPLETED | OUTPATIENT
Start: 2024-05-10 | End: 2024-05-10

## 2024-05-09 RX ORDER — ACETAMINOPHEN 325 MG/1
650 TABLET ORAL ONCE
Status: COMPLETED | OUTPATIENT
Start: 2024-05-09 | End: 2024-05-09

## 2024-05-09 RX ADMIN — ACETAMINOPHEN 650 MG: 325 TABLET ORAL at 23:28

## 2024-05-09 ASSESSMENT — PAIN - FUNCTIONAL ASSESSMENT: PAIN_FUNCTIONAL_ASSESSMENT: NONE - DENIES PAIN

## 2024-05-10 ENCOUNTER — APPOINTMENT (OUTPATIENT)
Dept: MRI IMAGING | Age: 79
End: 2024-05-10
Payer: MEDICARE

## 2024-05-10 ENCOUNTER — APPOINTMENT (OUTPATIENT)
Age: 79
End: 2024-05-10
Payer: MEDICARE

## 2024-05-10 PROBLEM — D64.9 ANEMIA: Status: ACTIVE | Noted: 2024-05-10

## 2024-05-10 PROBLEM — R11.2 NAUSEA AND VOMITING: Status: ACTIVE | Noted: 2024-05-10

## 2024-05-10 PROBLEM — R55 NEAR SYNCOPE: Status: ACTIVE | Noted: 2024-05-10

## 2024-05-10 PROBLEM — K86.2 PANCREATIC CYST: Status: ACTIVE | Noted: 2024-05-10

## 2024-05-10 LAB
ANION GAP SERPL CALCULATED.3IONS-SCNC: 10 MMOL/L (ref 9–17)
BACTERIA URNS QL MICRO: ABNORMAL
BILIRUB UR QL STRIP: NEGATIVE
BUN SERPL-MCNC: 31 MG/DL (ref 8–23)
CALCIUM SERPL-MCNC: 8.1 MG/DL (ref 8.6–10.4)
CANCER AG19-9 SERPL IA-ACNC: 31 U/ML (ref 0–35)
CASTS #/AREA URNS LPF: ABNORMAL /LPF
CEA SERPL-MCNC: 1.1 NG/ML (ref 0–3.8)
CHLORIDE SERPL-SCNC: 103 MMOL/L (ref 98–107)
CLARITY UR: CLEAR
CO2 SERPL-SCNC: 23 MMOL/L (ref 20–31)
COLOR UR: YELLOW
CREAT SERPL-MCNC: 1.3 MG/DL (ref 0.7–1.2)
DATE, STOOL #1: NORMAL
ECHO AO ROOT DIAM: 3.7 CM
ECHO AO ROOT INDEX: 2.13 CM/M2
ECHO AV AREA PEAK VELOCITY: 1.4 CM2
ECHO AV AREA VTI: 1.4 CM2
ECHO AV AREA/BSA PEAK VELOCITY: 0.8 CM2/M2
ECHO AV AREA/BSA VTI: 0.8 CM2/M2
ECHO AV MEAN GRADIENT: 5 MMHG
ECHO AV MEAN VELOCITY: 1.1 M/S
ECHO AV PEAK GRADIENT: 10 MMHG
ECHO AV PEAK VELOCITY: 1.6 M/S
ECHO AV VELOCITY RATIO: 0.63
ECHO AV VTI: 31 CM
ECHO BSA: 1.74 M2
ECHO EST RA PRESSURE: 8 MMHG
ECHO LA AREA 2C: 27.3 CM2
ECHO LA AREA 4C: 22.6 CM2
ECHO LA DIAMETER INDEX: 2.41 CM/M2
ECHO LA DIAMETER: 4.2 CM
ECHO LA MAJOR AXIS: 5.9 CM
ECHO LA MINOR AXIS: 6.1 CM
ECHO LA TO AORTIC ROOT RATIO: 1.14
ECHO LA VOL BP: 82 ML (ref 18–58)
ECHO LA VOL MOD A2C: 97 ML (ref 18–58)
ECHO LA VOL MOD A4C: 68 ML (ref 18–58)
ECHO LA VOL/BSA BIPLANE: 47 ML/M2 (ref 16–34)
ECHO LA VOLUME INDEX MOD A2C: 56 ML/M2 (ref 16–34)
ECHO LA VOLUME INDEX MOD A4C: 39 ML/M2 (ref 16–34)
ECHO LV E' LATERAL VELOCITY: 7 CM/S
ECHO LV E' SEPTAL VELOCITY: 5 CM/S
ECHO LV FRACTIONAL SHORTENING: 41 % (ref 28–44)
ECHO LV INTERNAL DIMENSION DIASTOLE INDEX: 2.64 CM/M2
ECHO LV INTERNAL DIMENSION DIASTOLIC: 4.6 CM (ref 4.2–5.9)
ECHO LV INTERNAL DIMENSION SYSTOLIC INDEX: 1.55 CM/M2
ECHO LV INTERNAL DIMENSION SYSTOLIC: 2.7 CM
ECHO LV IVSD: 0.9 CM (ref 0.6–1)
ECHO LV MASS 2D: 127.7 G (ref 88–224)
ECHO LV MASS INDEX 2D: 73.4 G/M2 (ref 49–115)
ECHO LV POSTERIOR WALL DIASTOLIC: 0.8 CM (ref 0.6–1)
ECHO LV RELATIVE WALL THICKNESS RATIO: 0.35
ECHO LVOT AREA: 2.3 CM2
ECHO LVOT AV VTI INDEX: 0.64
ECHO LVOT DIAM: 1.7 CM
ECHO LVOT MEAN GRADIENT: 2 MMHG
ECHO LVOT PEAK GRADIENT: 4 MMHG
ECHO LVOT PEAK VELOCITY: 1 M/S
ECHO LVOT STROKE VOLUME INDEX: 25.7 ML/M2
ECHO LVOT SV: 44.7 ML
ECHO LVOT VTI: 19.7 CM
ECHO MV A VELOCITY: 0.65 M/S
ECHO MV E DECELERATION TIME (DT): 180 MS
ECHO MV E VELOCITY: 1.02 M/S
ECHO MV E/A RATIO: 1.57
ECHO MV E/E' LATERAL: 14.57
ECHO MV E/E' RATIO (AVERAGED): 17.49
ECHO MV E/E' SEPTAL: 20.4
ECHO RIGHT VENTRICULAR SYSTOLIC PRESSURE (RVSP): 33 MMHG
ECHO RV TAPSE: 1.6 CM (ref 1.7–?)
ECHO TV REGURGITANT MAX VELOCITY: 2.48 M/S
ECHO TV REGURGITANT PEAK GRADIENT: 25 MMHG
EKG ATRIAL RATE: 80 BPM
EKG ATRIAL RATE: 88 BPM
EKG P AXIS: 53 DEGREES
EKG P AXIS: 66 DEGREES
EKG P-R INTERVAL: 144 MS
EKG P-R INTERVAL: 154 MS
EKG Q-T INTERVAL: 354 MS
EKG Q-T INTERVAL: 390 MS
EKG QRS DURATION: 78 MS
EKG QRS DURATION: 84 MS
EKG QTC CALCULATION (BAZETT): 428 MS
EKG QTC CALCULATION (BAZETT): 449 MS
EKG R AXIS: 23 DEGREES
EKG R AXIS: 26 DEGREES
EKG T AXIS: 29 DEGREES
EKG T AXIS: 40 DEGREES
EKG VENTRICULAR RATE: 80 BPM
EKG VENTRICULAR RATE: 88 BPM
EPI CELLS #/AREA URNS HPF: ABNORMAL /HPF
FERRITIN SERPL-MCNC: 16 NG/ML (ref 30–400)
FLUAV RNA RESP QL NAA+PROBE: NOT DETECTED
FLUBV RNA RESP QL NAA+PROBE: NOT DETECTED
FOLATE SERPL-MCNC: >20 NG/ML (ref 4.8–24.2)
GFR, ESTIMATED: 56 ML/MIN/1.73M2
GLUCOSE BLD-MCNC: 132 MG/DL (ref 75–110)
GLUCOSE BLD-MCNC: 133 MG/DL (ref 75–110)
GLUCOSE BLD-MCNC: 136 MG/DL (ref 75–110)
GLUCOSE BLD-MCNC: 166 MG/DL (ref 75–110)
GLUCOSE SERPL-MCNC: 114 MG/DL (ref 70–99)
GLUCOSE UR STRIP-MCNC: NEGATIVE MG/DL
HAPTOGLOB SERPL-MCNC: 146 MG/DL (ref 30–200)
HEMOCCULT SP1 STL QL: NEGATIVE
HGB UR QL STRIP.AUTO: NEGATIVE
IRON SATN MFR SERPL: 6 % (ref 20–55)
IRON SERPL-MCNC: 16 UG/DL (ref 61–157)
KETONES UR STRIP-MCNC: NEGATIVE MG/DL
LDH SERPL-CCNC: 169 U/L (ref 135–225)
LEUKOCYTE ESTERASE UR QL STRIP: NEGATIVE
LIPASE SERPL-CCNC: 8 U/L (ref 13–60)
MAGNESIUM SERPL-MCNC: 2.1 MG/DL (ref 1.6–2.6)
NITRITE UR QL STRIP: NEGATIVE
PH UR STRIP: 5.5 [PH] (ref 5–8)
PHOSPHATE SERPL-MCNC: 3.3 MG/DL (ref 2.5–4.5)
POTASSIUM SERPL-SCNC: 3.8 MMOL/L (ref 3.7–5.3)
PROT UR STRIP-MCNC: ABNORMAL MG/DL
RBC #/AREA URNS HPF: ABNORMAL /HPF
RETICS # AUTO: 0.04 M/UL (ref 0.02–0.1)
RETICS/RBC NFR AUTO: 1.1 % (ref 0.5–2)
SARS-COV-2 RNA RESP QL NAA+PROBE: NOT DETECTED
SODIUM SERPL-SCNC: 136 MMOL/L (ref 135–144)
SOURCE: NORMAL
SP GR UR STRIP: 1.05 (ref 1–1.03)
SPECIMEN DESCRIPTION: NORMAL
TIBC SERPL-MCNC: 283 UG/DL (ref 250–450)
TIME, STOOL #1: 333
TROPONIN I SERPL HS-MCNC: 15 NG/L (ref 0–22)
UNSATURATED IRON BINDING CAPACITY: 267 UG/DL (ref 112–347)
UROBILINOGEN UR STRIP-ACNC: NORMAL EU/DL (ref 0–1)
VIT B12 SERPL-MCNC: 944 PG/ML (ref 232–1245)
WBC #/AREA URNS HPF: ABNORMAL /HPF

## 2024-05-10 PROCEDURE — 2580000003 HC RX 258: Performed by: NURSE PRACTITIONER

## 2024-05-10 PROCEDURE — 81001 URINALYSIS AUTO W/SCOPE: CPT

## 2024-05-10 PROCEDURE — 97530 THERAPEUTIC ACTIVITIES: CPT

## 2024-05-10 PROCEDURE — 82746 ASSAY OF FOLIC ACID SERUM: CPT

## 2024-05-10 PROCEDURE — 80048 BASIC METABOLIC PNL TOTAL CA: CPT

## 2024-05-10 PROCEDURE — 97162 PT EVAL MOD COMPLEX 30 MIN: CPT

## 2024-05-10 PROCEDURE — 36415 COLL VENOUS BLD VENIPUNCTURE: CPT

## 2024-05-10 PROCEDURE — 83010 ASSAY OF HAPTOGLOBIN QUANT: CPT

## 2024-05-10 PROCEDURE — 74183 MRI ABD W/O CNTR FLWD CNTR: CPT

## 2024-05-10 PROCEDURE — 82378 CARCINOEMBRYONIC ANTIGEN: CPT

## 2024-05-10 PROCEDURE — 85045 AUTOMATED RETICULOCYTE COUNT: CPT

## 2024-05-10 PROCEDURE — 83735 ASSAY OF MAGNESIUM: CPT

## 2024-05-10 PROCEDURE — 83690 ASSAY OF LIPASE: CPT

## 2024-05-10 PROCEDURE — 93010 ELECTROCARDIOGRAM REPORT: CPT | Performed by: INTERNAL MEDICINE

## 2024-05-10 PROCEDURE — 99223 1ST HOSP IP/OBS HIGH 75: CPT | Performed by: INTERNAL MEDICINE

## 2024-05-10 PROCEDURE — 83615 LACTATE (LD) (LDH) ENZYME: CPT

## 2024-05-10 PROCEDURE — 2060000000 HC ICU INTERMEDIATE R&B

## 2024-05-10 PROCEDURE — A9579 GAD-BASE MR CONTRAST NOS,1ML: HCPCS | Performed by: NURSE PRACTITIONER

## 2024-05-10 PROCEDURE — 2580000003 HC RX 258

## 2024-05-10 PROCEDURE — 86301 IMMUNOASSAY TUMOR CA 19-9: CPT

## 2024-05-10 PROCEDURE — APPNB30 APP NON BILLABLE TIME 0-30 MINS: Performed by: NURSE PRACTITIONER

## 2024-05-10 PROCEDURE — 84100 ASSAY OF PHOSPHORUS: CPT

## 2024-05-10 PROCEDURE — 93306 TTE W/DOPPLER COMPLETE: CPT

## 2024-05-10 PROCEDURE — 82607 VITAMIN B-12: CPT

## 2024-05-10 PROCEDURE — 82728 ASSAY OF FERRITIN: CPT

## 2024-05-10 PROCEDURE — 83540 ASSAY OF IRON: CPT

## 2024-05-10 PROCEDURE — 93005 ELECTROCARDIOGRAM TRACING: CPT | Performed by: STUDENT IN AN ORGANIZED HEALTH CARE EDUCATION/TRAINING PROGRAM

## 2024-05-10 PROCEDURE — 84484 ASSAY OF TROPONIN QUANT: CPT

## 2024-05-10 PROCEDURE — 6360000004 HC RX CONTRAST MEDICATION

## 2024-05-10 PROCEDURE — 2580000003 HC RX 258: Performed by: INTERNAL MEDICINE

## 2024-05-10 PROCEDURE — 6370000000 HC RX 637 (ALT 250 FOR IP): Performed by: NURSE PRACTITIONER

## 2024-05-10 PROCEDURE — 82270 OCCULT BLOOD FECES: CPT

## 2024-05-10 PROCEDURE — 83550 IRON BINDING TEST: CPT

## 2024-05-10 PROCEDURE — 82947 ASSAY GLUCOSE BLOOD QUANT: CPT

## 2024-05-10 PROCEDURE — 6360000004 HC RX CONTRAST MEDICATION: Performed by: NURSE PRACTITIONER

## 2024-05-10 PROCEDURE — 93306 TTE W/DOPPLER COMPLETE: CPT | Performed by: INTERNAL MEDICINE

## 2024-05-10 PROCEDURE — 97165 OT EVAL LOW COMPLEX 30 MIN: CPT

## 2024-05-10 PROCEDURE — 2580000003 HC RX 258: Performed by: STUDENT IN AN ORGANIZED HEALTH CARE EDUCATION/TRAINING PROGRAM

## 2024-05-10 PROCEDURE — 87636 SARSCOV2 & INF A&B AMP PRB: CPT

## 2024-05-10 RX ORDER — ATORVASTATIN CALCIUM 20 MG/1
20 TABLET, FILM COATED ORAL DAILY
Status: DISCONTINUED | OUTPATIENT
Start: 2024-05-10 | End: 2024-05-11 | Stop reason: HOSPADM

## 2024-05-10 RX ORDER — AMLODIPINE BESYLATE 5 MG/1
5 TABLET ORAL DAILY
Status: DISCONTINUED | OUTPATIENT
Start: 2024-05-10 | End: 2024-05-11 | Stop reason: HOSPADM

## 2024-05-10 RX ORDER — INSULIN LISPRO 100 [IU]/ML
0-8 INJECTION, SOLUTION INTRAVENOUS; SUBCUTANEOUS
Status: DISCONTINUED | OUTPATIENT
Start: 2024-05-10 | End: 2024-05-11 | Stop reason: HOSPADM

## 2024-05-10 RX ORDER — 0.9 % SODIUM CHLORIDE 0.9 %
500 INTRAVENOUS SOLUTION INTRAVENOUS ONCE
Status: COMPLETED | OUTPATIENT
Start: 2024-05-10 | End: 2024-05-10

## 2024-05-10 RX ORDER — PANTOPRAZOLE SODIUM 40 MG/1
40 TABLET, DELAYED RELEASE ORAL
Status: DISCONTINUED | OUTPATIENT
Start: 2024-05-10 | End: 2024-05-11 | Stop reason: HOSPADM

## 2024-05-10 RX ORDER — ASPIRIN 81 MG/1
81 TABLET, CHEWABLE ORAL DAILY
Status: DISCONTINUED | OUTPATIENT
Start: 2024-05-11 | End: 2024-05-11 | Stop reason: HOSPADM

## 2024-05-10 RX ORDER — SODIUM CHLORIDE 0.9 % (FLUSH) 0.9 %
10 SYRINGE (ML) INJECTION PRN
Status: DISCONTINUED | OUTPATIENT
Start: 2024-05-10 | End: 2024-05-11 | Stop reason: HOSPADM

## 2024-05-10 RX ORDER — METOPROLOL TARTRATE 100 MG/1
100 TABLET ORAL 2 TIMES DAILY
Status: DISCONTINUED | OUTPATIENT
Start: 2024-05-10 | End: 2024-05-11 | Stop reason: HOSPADM

## 2024-05-10 RX ORDER — POTASSIUM CHLORIDE 7.45 MG/ML
10 INJECTION INTRAVENOUS PRN
Status: DISCONTINUED | OUTPATIENT
Start: 2024-05-10 | End: 2024-05-11 | Stop reason: HOSPADM

## 2024-05-10 RX ORDER — SODIUM CHLORIDE 0.9 % (FLUSH) 0.9 %
5-40 SYRINGE (ML) INJECTION PRN
Status: DISCONTINUED | OUTPATIENT
Start: 2024-05-10 | End: 2024-05-11 | Stop reason: HOSPADM

## 2024-05-10 RX ORDER — ONDANSETRON 4 MG/1
4 TABLET, ORALLY DISINTEGRATING ORAL EVERY 8 HOURS PRN
Status: DISCONTINUED | OUTPATIENT
Start: 2024-05-10 | End: 2024-05-11 | Stop reason: HOSPADM

## 2024-05-10 RX ORDER — SODIUM CHLORIDE, SODIUM LACTATE, POTASSIUM CHLORIDE, CALCIUM CHLORIDE 600; 310; 30; 20 MG/100ML; MG/100ML; MG/100ML; MG/100ML
INJECTION, SOLUTION INTRAVENOUS CONTINUOUS
Status: DISCONTINUED | OUTPATIENT
Start: 2024-05-10 | End: 2024-05-11 | Stop reason: HOSPADM

## 2024-05-10 RX ORDER — SODIUM CHLORIDE 9 MG/ML
INJECTION, SOLUTION INTRAVENOUS PRN
Status: DISCONTINUED | OUTPATIENT
Start: 2024-05-10 | End: 2024-05-11 | Stop reason: HOSPADM

## 2024-05-10 RX ORDER — DEXTROSE MONOHYDRATE 100 MG/ML
INJECTION, SOLUTION INTRAVENOUS CONTINUOUS PRN
Status: DISCONTINUED | OUTPATIENT
Start: 2024-05-10 | End: 2024-05-11 | Stop reason: HOSPADM

## 2024-05-10 RX ORDER — MAGNESIUM SULFATE HEPTAHYDRATE 40 MG/ML
2000 INJECTION, SOLUTION INTRAVENOUS PRN
Status: DISCONTINUED | OUTPATIENT
Start: 2024-05-10 | End: 2024-05-11 | Stop reason: HOSPADM

## 2024-05-10 RX ORDER — ASPIRIN 81 MG/1
81 TABLET ORAL DAILY
Status: DISCONTINUED | OUTPATIENT
Start: 2024-05-10 | End: 2024-05-10

## 2024-05-10 RX ORDER — TROSPIUM CHLORIDE 20 MG/1
20 TABLET, FILM COATED ORAL
Status: DISCONTINUED | OUTPATIENT
Start: 2024-05-10 | End: 2024-05-11 | Stop reason: HOSPADM

## 2024-05-10 RX ORDER — ONDANSETRON 2 MG/ML
4 INJECTION INTRAMUSCULAR; INTRAVENOUS EVERY 6 HOURS PRN
Status: DISCONTINUED | OUTPATIENT
Start: 2024-05-10 | End: 2024-05-11 | Stop reason: HOSPADM

## 2024-05-10 RX ORDER — LOSARTAN POTASSIUM 25 MG/1
25 TABLET ORAL DAILY
Status: DISCONTINUED | OUTPATIENT
Start: 2024-05-10 | End: 2024-05-11 | Stop reason: HOSPADM

## 2024-05-10 RX ORDER — SODIUM CHLORIDE 0.9 % (FLUSH) 0.9 %
5-40 SYRINGE (ML) INJECTION EVERY 12 HOURS SCHEDULED
Status: DISCONTINUED | OUTPATIENT
Start: 2024-05-10 | End: 2024-05-11 | Stop reason: HOSPADM

## 2024-05-10 RX ORDER — POTASSIUM CHLORIDE 20 MEQ/1
40 TABLET, EXTENDED RELEASE ORAL PRN
Status: DISCONTINUED | OUTPATIENT
Start: 2024-05-10 | End: 2024-05-11 | Stop reason: HOSPADM

## 2024-05-10 RX ORDER — 0.9 % SODIUM CHLORIDE 0.9 %
40 INTRAVENOUS SOLUTION INTRAVENOUS ONCE
Status: COMPLETED | OUTPATIENT
Start: 2024-05-10 | End: 2024-05-10

## 2024-05-10 RX ORDER — ACETAMINOPHEN 650 MG/1
650 SUPPOSITORY RECTAL EVERY 6 HOURS PRN
Status: DISCONTINUED | OUTPATIENT
Start: 2024-05-10 | End: 2024-05-11 | Stop reason: HOSPADM

## 2024-05-10 RX ORDER — INSULIN GLARGINE 100 [IU]/ML
10 INJECTION, SOLUTION SUBCUTANEOUS NIGHTLY
Status: DISCONTINUED | OUTPATIENT
Start: 2024-05-10 | End: 2024-05-11 | Stop reason: HOSPADM

## 2024-05-10 RX ORDER — ACETAMINOPHEN 325 MG/1
650 TABLET ORAL EVERY 6 HOURS PRN
Status: DISCONTINUED | OUTPATIENT
Start: 2024-05-10 | End: 2024-05-11 | Stop reason: HOSPADM

## 2024-05-10 RX ORDER — POLYETHYLENE GLYCOL 3350 17 G/17G
17 POWDER, FOR SOLUTION ORAL DAILY PRN
Status: DISCONTINUED | OUTPATIENT
Start: 2024-05-10 | End: 2024-05-11 | Stop reason: HOSPADM

## 2024-05-10 RX ORDER — INSULIN LISPRO 100 [IU]/ML
0-4 INJECTION, SOLUTION INTRAVENOUS; SUBCUTANEOUS NIGHTLY
Status: DISCONTINUED | OUTPATIENT
Start: 2024-05-10 | End: 2024-05-11 | Stop reason: HOSPADM

## 2024-05-10 RX ADMIN — SODIUM CHLORIDE 40 ML: 9 INJECTION, SOLUTION INTRAVENOUS at 17:48

## 2024-05-10 RX ADMIN — TROSPIUM CHLORIDE 20 MG: 20 TABLET, FILM COATED ORAL at 05:05

## 2024-05-10 RX ADMIN — SODIUM CHLORIDE, PRESERVATIVE FREE 10 ML: 5 INJECTION INTRAVENOUS at 00:02

## 2024-05-10 RX ADMIN — APIXABAN 2.5 MG: 2.5 TABLET, FILM COATED ORAL at 08:23

## 2024-05-10 RX ADMIN — SODIUM CHLORIDE, POTASSIUM CHLORIDE, SODIUM LACTATE AND CALCIUM CHLORIDE: 600; 310; 30; 20 INJECTION, SOLUTION INTRAVENOUS at 12:30

## 2024-05-10 RX ADMIN — ATORVASTATIN CALCIUM 20 MG: 20 TABLET, FILM COATED ORAL at 08:23

## 2024-05-10 RX ADMIN — PANTOPRAZOLE SODIUM 40 MG: 40 TABLET, DELAYED RELEASE ORAL at 05:06

## 2024-05-10 RX ADMIN — SODIUM CHLORIDE, PRESERVATIVE FREE 10 ML: 5 INJECTION INTRAVENOUS at 08:23

## 2024-05-10 RX ADMIN — PANCRELIPASE LIPASE, PANCRELIPASE PROTEASE, PANCRELIPASE AMYLASE 15000 UNITS: 15000; 47000; 63000 CAPSULE, DELAYED RELEASE ORAL at 17:44

## 2024-05-10 RX ADMIN — TROSPIUM CHLORIDE 20 MG: 20 TABLET, FILM COATED ORAL at 17:44

## 2024-05-10 RX ADMIN — SODIUM CHLORIDE 500 ML: 9 INJECTION, SOLUTION INTRAVENOUS at 00:55

## 2024-05-10 RX ADMIN — PANCRELIPASE LIPASE, PANCRELIPASE PROTEASE, PANCRELIPASE AMYLASE 15000 UNITS: 15000; 47000; 63000 CAPSULE, DELAYED RELEASE ORAL at 08:23

## 2024-05-10 RX ADMIN — IOPAMIDOL 75 ML: 755 INJECTION, SOLUTION INTRAVENOUS at 00:02

## 2024-05-10 RX ADMIN — ACETAMINOPHEN 650 MG: 325 TABLET ORAL at 11:18

## 2024-05-10 RX ADMIN — GADOTERIDOL 15 ML: 279.3 INJECTION, SOLUTION INTRAVENOUS at 17:48

## 2024-05-10 RX ADMIN — ACETAMINOPHEN 650 MG: 325 TABLET ORAL at 19:45

## 2024-05-10 RX ADMIN — SODIUM CHLORIDE 500 ML: 9 INJECTION, SOLUTION INTRAVENOUS at 02:11

## 2024-05-10 RX ADMIN — SODIUM CHLORIDE 100 ML: 9 INJECTION, SOLUTION INTRAVENOUS at 00:02

## 2024-05-10 RX ADMIN — PANCRELIPASE LIPASE, PANCRELIPASE PROTEASE, PANCRELIPASE AMYLASE 15000 UNITS: 15000; 47000; 63000 CAPSULE, DELAYED RELEASE ORAL at 11:25

## 2024-05-10 RX ADMIN — SODIUM CHLORIDE, PRESERVATIVE FREE 10 ML: 5 INJECTION INTRAVENOUS at 17:48

## 2024-05-10 ASSESSMENT — PAIN DESCRIPTION - LOCATION
LOCATION: HEAD
LOCATION: ABDOMEN

## 2024-05-10 ASSESSMENT — PAIN DESCRIPTION - ONSET: ONSET: ON-GOING

## 2024-05-10 ASSESSMENT — PAIN DESCRIPTION - ORIENTATION
ORIENTATION: ANTERIOR
ORIENTATION: MID

## 2024-05-10 ASSESSMENT — PAIN SCALES - GENERAL
PAINLEVEL_OUTOF10: 3
PAINLEVEL_OUTOF10: 3

## 2024-05-10 ASSESSMENT — PAIN DESCRIPTION - FREQUENCY: FREQUENCY: CONTINUOUS

## 2024-05-10 ASSESSMENT — PAIN - FUNCTIONAL ASSESSMENT: PAIN_FUNCTIONAL_ASSESSMENT: ACTIVITIES ARE NOT PREVENTED

## 2024-05-10 ASSESSMENT — PAIN DESCRIPTION - PAIN TYPE: TYPE: ACUTE PAIN

## 2024-05-10 ASSESSMENT — PAIN DESCRIPTION - DESCRIPTORS
DESCRIPTORS: ACHING
DESCRIPTORS: ACHING

## 2024-05-10 NOTE — CONSULTS
[facility]    GASTROENTEROLOGY CONSULT    Patient:   Jose M Tovar   :    1945   Facility:   [facility]  Date:    5/10/2024  Admission Dx:  Near syncope [R55]  Anemia, unspecified type [D64.9]  Nausea and vomiting, unspecified vomiting type [R11.2]  Requesting physician: Misael Sylvester MD  Reason for consult:  Pancreatic cyst ?PD obstruction      SUBJECTIVE:  History of Present Illness:  This is a 78 y.o.   male who was admitted 2024 with Near syncope [R55]  Anemia, unspecified type [D64.9]  Nausea and vomiting, unspecified vomiting type [R11.2]. We have been asked to see the patient in consultation by Misael Sylvester MD for  pancreatic cyst ?pancreatic obstruction. 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 He fell forward but did not lose consciousness. Pts nausea is now resolved, he has not had abdominal pain or fevers. He has a hx of pancreatic cysts with eus that is discussed below. He gets routine mri abd yearly and is due in July. He takes zenpep for pancreatic insufficiency. This admission he was found to have hgb of 8.8 iron 16 with 6% saturation.  Fobt negative,  hgb in  was around 12.5.Lipase 8 lft normal creat 1.3. ct head and cervical spine were negative for acute findings.   Ct abd today shows  IMPRESSION:  1. 3 cm cystic mass at the superior aspect of the pancreas near the junction  of the body and tail.  This may represent a side branch IPMN.  The pancreatic  head and uncinate process are poorly visualized.  Follow-up pre and  post-contrast MRI recommended.  2. No acute intra-abdominal or intrapelvic process is identified.  3. Status post Kirsten-en-Y gastric bypass without evidence of postoperative  complication.  4. Status post cholecystectomy.  5. Mild sigmoid  diverticulosis without evidence of diverticulitis.    No c//o weight loss dysphagia melena hematemesis hematochezia change in the bowel habits.    GI history: office notes 3/19/24 josé miguel funes  EUS 06/23/2023: Classic Whipple found. Healthy-appearing mucosa. Medium amount of food in the stomach. Few gastric polyps. Findings suggestive of pancreatic stones identified the body of the pancreas. Multiple cystic lesions were seen in the pancreatic body. FNA for fluid performed  Pathology: Esophageal polyp - polypoid fragments of hyperplastic gastric cardia type mucosa with reactive changes.  Cytology: No malignant cells identified. Hypocellular specimen comprising bacteria and scant benign glandular epithelial cells.    EUS 7/22/20:Normal ampulla, duodenal bulb and second portion of the duodenum. Multiple gastric polyps. Biopsied. Normal esophagus. A cystic lesion was seen in the pancreatic head. Fine needle aspiration for fluid performed. A cystic lesion was seen in the pancreatic body. Fine needle aspiration for fluid performed. There was no sign of significant pathology in the entire main bile duct and in the gallbladder. Cytology: 1. Head of pancreas fine needle aspirate: Scant mucin, rare epithelial cells, nondiagnostic. 2. Body of pancreas fine needle aspirate: Scant mucin, rare epithelial cells, nondiagnostic. Pathology: 1. Gastric polyp, biopsy: Inflammatory/hyperplastic polyp. No adenomatous change identified. 2. Gastric biopsies: Gastric antral mucosa with regeneration of gastric pits. Unremarkable gastric corpus mucosa. No inflammation, intestinal metaplasia, dysplasia or H. pylori organisms identified. CEA were elevated on both cysts.    MRCP/MRI 07/25/2023 - multiple cystic pancreatic lesions are stable in size with largest measuring up to 2.8 cm stable. Continued imaging surveillance recommended if clinically indicated. 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  Tumor markers  Mri abd with contrast  Antiemetics prn    2.anemia, iron deficiency fobt negative  Trend hh  No endoscopy at this time  Ppi    3.presyncope improved      This plan was formulated in collaboration with Dr. Ruff  .  Thank you for allowing us to participate in the care of your patient.    Electronically signed by: GENEVA Padilla CNP on 5/10/2024 at 12:42 PM       Attending Physician Statement  I have discussed the care of Jose M Tovar and I have examined the patient myselft and taken ros and hpi , including pertinent history and exam findings,  with the author of this note . I have reviewed the key elements of all parts of the encounter with the nurse practitioner/resident.  I agree with the assessment, plan and orders as documented by the above health care provider     The patient status post Whipple for pancreatic cyst showed low-grade mucinous neoplasm with no high-grade dysplasia  Currently with nausea and vomiting  He is admitted because of fall  Had multiple EUS in the past  Plan for     MRI  Tumor markers  Symptomatic treatment  Anemia workup    Electronically signed by Enmanuel Ruff MD

## 2024-05-10 NOTE — PROGRESS NOTES
[facility]   Occupational Therapy Evaluation  Date: 5/10/24  Patient Name: Jose M Tovar       Room: -01  MRN: [id number]  Account: [id number]   : 1945  (78 y.o.) Gender: male     Discharge Recommendations:  The patient's needs are being met with no further Occupational Therapy recommended at discharge.       OT Equipment Recommendations  Equipment Needed: No    Referring Practitioner: Adelina Trevizo APRN - NP  Diagnosis: Near syncope   Additional Pertinent Hx: Per chart review: 78 y.o. male who presents with Emesis and Fall and is admitted to the hospital for the management of Near syncope. Medical history is significant for CAD, CKD stage 3b, HTN, HLD, Hx of DVT on Eliquis,  Diabetes Mellitus.    According to patient, he felt nauseated after eating lunch. Then after dinner he had multiple episodes of vomiting. A few hours later when getting up to restroom, felt lightheaded and dizzy resulting in falling forward. Denies loss of consciousness.  He has bruising to upper lip and nose, denies any other injuries. Reporting generalized weakness. CT head and cervical spine negative for acute abnormality.         Past Medical History:  has a past medical history of Aortic insufficiency, CAD (coronary artery disease), Chronic kidney disease, Diabetes mellitus (HCC), Frozen shoulder, GERD (gastroesophageal reflux disease), Hx of blood clots, Hyperlipidemia, Hypertension, Kidney calculi, Kidney disease, Neuropathy, Obstructive sleep apnea on CPAP, Osteoarthritis, and Wears partial dentures.    Past Surgical History:   has a past surgical history that includes Tonsillectomy; Rectal surgery; shoulder surgery; Achilles tendon surgery (Left); Cataract removal with implant (Bilateral); Rotator cuff repair (Left); joint replacement (Left); Appendectomy; Dental surgery; and Coronary artery bypass graft (2019).    Restrictions  Restrictions/Precautions  Restrictions/Precautions: Fall  baseline  Toileting: Supervision  Additional Comments: ADL scores based on clinical reasoning unless otherwise noted.         UE Function  LUE AROM (degrees)  LUE AROM : Exceptions  LUE General AROM: Shoulder flexion 0-45 degrees otherwise WFL (Limited due to L shoulder injury)  Left Hand AROM (degrees)  Left Hand AROM: WFL  Tone LUE  LUE Tone: Normotonic       RUE AROM (degrees)  RUE AROM : WFL  Right Hand AROM (degrees)  Right Hand AROM: WFL  Tone RUE  RUE Tone: Normotonic            Fine Motor Skills/Coordination  Coordination  Movements Are Fluid And Coordinated: No  Coordination and Movement Description: Gross motor impairments, Left UE (Chronic L shoulder injury)     Bed Mobility  Bed mobility  Supine to Sit: Independent  Sit to Supine: Unable to assess  Scooting: Independent  Bed Mobility Comments: HOB flat without use of railing. Pt up in chair with chair alarm on at end of session    Balance  Balance  Sitting Balance: Independent  Standing Balance: Stand by assistance  Standing Balance  Time: 4-5 minutes  Activity: functional transfers/mobility  Comment: Occasional UE support as needed during stair management    Transfers  Transfers  Sit to stand: Stand by assistance  Stand to sit: Stand by assistance    Functional Mobility  Functional - Mobility Device: No device  Activity: Other (Throughout patient's room and hallway)  Assist Level: Stand by assistance  Functional Mobility Comments: No LOB noted, denied feeling lightheaded/dizzy. Simulated household distances    Assessment  Assessment  Assessment: Patient at baseline function for self care tasks and demonstrated safe mobility, simulating household distances. He denied concerns upon discharge. Discontinue occupational therapy as no skilled services are identified. Physical therapy to continue to address balance/mobility. Please re-order OT if future needs arise.  Decision Making: Low Complexity  No Skilled OT: Independent with ADL's, At baseline function,

## 2024-05-10 NOTE — ACP (ADVANCE CARE PLANNING)
Advance Care Planning     Advance Care Planning Activator (Inpatient)  Conversation Note      Date of ACP Conversation: 5/10/2024     Conversation Conducted with: Patient with Decision Making Capacity    ACP Activator: Meghann Rose RN    Health Care Decision Maker:     Current Designated Health Care Decision Maker:     Click here to complete Healthcare Decision Makers including section of the Healthcare Decision Maker Relationship (ie \"Primary\")  Today we documented Decision Maker(s) consistent with Legal Next of Kin hierarchy.    Care Preferences    Ventilation:  \"If you were in your present state of health and suddenly became very ill and were unable to breathe on your own, what would your preference be about the use of a ventilator (breathing machine) if it were available to you?\"      Would the patient desire the use of ventilator (breathing machine)?: yes    \"If your health worsens and it becomes clear that your chance of recovery is unlikely, what would your preference be about the use of a ventilator (breathing machine) if it were available to you?\"     Would the patient desire the use of ventilator (breathing machine)?: No      Resuscitation  \"CPR works best to restart the heart when there is a sudden event, like a heart attack, in someone who is otherwise healthy. Unfortunately, CPR does not typically restart the heart for people who have serious health conditions or who are very sick.\"    \"In the event your heart stopped as a result of an underlying serious health condition, would you want attempts to be made to restart your heart (answer \"yes\" for attempt to resuscitate) or would you prefer a natural death (answer \"no\" for do not attempt to resuscitate)?\" yes       [] Yes   [x] No   Educated Patient / Decision Maker regarding differences between Advance Directives and portable DNR orders.    Length of ACP Conversation in minutes:      Conversation Outcomes:  ACP discussion completed    Follow-up plan:

## 2024-05-10 NOTE — PROGRESS NOTES
[facility]  George Walker MD; Fazal Mcconnell MD; Misael Sylvester MD; MD Bianca Carrillo MD; Ajith Aldrich MD  [facility]   IN-PATIENT SERVICE  [facility]                 Date:   5/10/2024  Patientname:  Jose M Tovar  Date of admission:  5/9/2024 10:34 PM  MRN:   [id number]  Account:  [id number]  YOB: 1945  PCP:    Ger Kidd MD  Room:   2122/2122-01  Code Status:    Full Code      Chief Complaint:     Chief Complaint   Patient presents with    Emesis    Fall     About an hour ago patient went to the bathroom in his home and became lightheaded and fell to the floor, no LOC. Bruising to nose and upper lip       History of Present Illness:     Jose M Tovar is a 78 y.o. Non- / non  male who presents with Emesis and Fall and is admitted to the hospital for the management of Near syncope. Medical history is significant for CAD, CKD stage 3b, HTN, HLD, Hx of DVT on Eliquis,  Diabetes Mellitus.    According to patient, he felt nauseated after eating lunch. Then after dinner he had multiple episodes of vomiting. A few hours later when getting up to restroom, felt lightheaded and dizzy resulting in falling forward. Denies loss of consciousness.  He has bruising to upper lip and nose, denies any other injuries. Reporting generalized weakness. CT head and cervical spine negative for acute abnormality. EKG NSR, no ST or T-wave changes. Troponin flat at 15. BNP 2500. Denies chest pain. No recent cardiac testing. Creatinine 1.3, baseline with hx of CKD 3b. Hgb 8.8. Previous levels 12-13. Denies signs or symptoms of bleeding. Stool for OB negative. CT abdomen pelvis 3 cm cystic mass near superior aspect of pancreas. Lipase 8. Denies chills, chest pain, cough and urinary symptoms. Symptoms are reported as acute, intermittent and severe.     Past Medical History:     Past Medical History:   Diagnosis Date    Aortic  Rate 88 BPM    P-R Interval 144 ms    QRS Duration 78 ms    Q-T Interval 354 ms    QTc Calculation (Bazett) 428 ms    P Axis 53 degrees    R Axis 23 degrees    T Axis 29 degrees   Urinalysis with Reflex to Culture    Collection Time: 05/10/24  1:55 AM    Specimen: Urine   Result Value Ref Range    Color, UA Yellow Yellow    Turbidity UA Clear Clear    Glucose, Ur NEGATIVE NEGATIVE mg/dL    Bilirubin, Urine NEGATIVE NEGATIVE    Ketones, Urine NEGATIVE NEGATIVE mg/dL    Specific Gravity, UA 1.052 (H) 1.000 - 1.030    Urine Hgb NEGATIVE NEGATIVE    pH, Urine 5.5 5.0 - 8.0    Protein, UA TRACE (A) NEGATIVE mg/dL    Urobilinogen, Urine Normal 0.0 - 1.0 EU/dL    Nitrite, Urine NEGATIVE NEGATIVE    Leukocyte Esterase, Urine NEGATIVE NEGATIVE   Microscopic Urinalysis    Collection Time: 05/10/24  1:55 AM   Result Value Ref Range    WBC, UA 0 TO 2 (A) 0 TO 5 /HPF    RBC, UA 0 TO 2 0 TO 2 /HPF    Casts UA 0 TO 2 (A) None /LPF    Epithelial Cells UA 0 TO 2 /HPF    Bacteria, UA None None   EKG 12 Lead    Collection Time: 05/10/24  2:22 AM   Result Value Ref Range    Ventricular Rate 80 BPM    Atrial Rate 80 BPM    P-R Interval 154 ms    QRS Duration 84 ms    Q-T Interval 390 ms    QTc Calculation (Bazett) 449 ms    P Axis 66 degrees    R Axis 26 degrees    T Axis 40 degrees   Troponin    Collection Time: 05/10/24  2:23 AM   Result Value Ref Range    Troponin, High Sensitivity 15 0 - 22 ng/L   Blood Occult Stool Screen #1    Collection Time: 05/10/24  3:31 AM   Result Value Ref Range    Occult Blood, Stool #1 NEGATIVE NEGATIVE    Date, Stool #1 5,102,024     Time, Stool #1 333        Imaging/Diagnostics:  CT ABDOMEN PELVIS W IV CONTRAST Additional Contrast? None    Result Date: 5/10/2024  1. 3 cm cystic mass at the superior aspect of the pancreas near the junction of the body and tail.  This may represent a side branch IPMN.  The pancreatic head and uncinate process are poorly visualized.  Follow-up pre and post-contrast MRI

## 2024-05-10 NOTE — ED NOTES
Report given to RN from PCU.   Report method by phone   The following was reviewed with receiving RN:   Current vital signs:  BP (!) 108/59   Pulse 78   Temp 99.5 °F (37.5 °C) (Oral)   Resp 22   Ht 1.676 m (5' 6\")   Wt 65.3 kg (144 lb)   SpO2 96%   BMI 23.24 kg/m²                MEWS Score: 1     Any medication or safety alerts were reviewed. Any pending diagnostics and notifications were also reviewed, as well as any safety concerns or issues, abnormal labs, abnormal imaging, and abnormal assessment findings. Questions were answered.

## 2024-05-10 NOTE — ED NOTES
Writer chaperoned a rectal exam performed by Dr. Hawk. Blood occult sample sent down to the lab with a patient label.

## 2024-05-10 NOTE — ED PROVIDER NOTES
[facility] ED  Emergency Department Encounter  Emergency Medicine Resident     Pt Name:Jose M Tovar  MRN: [id number]  Birthdate 1945  Date of evaluation: 5/9/24  PCP:  Ger Kidd MD  Note Started: 10:46 PM EDT      CHIEF COMPLAINT       Chief Complaint   Patient presents with    Emesis    Fall     About an hour ago patient went to the bathroom in his home and became lightheaded and fell to the floor, no LOC. Bruising to nose and upper lip       HISTORY OF PRESENT ILLNESS  (Location/Symptom, Timing/Onset, Context/Setting, Quality, Duration, Modifying Factors, Severity.)      Jose M Tovar is a 78 y.o. male who presents with fall.  Patient states that he ate lunch today which involved hand.  He states he felt like the hand did not agree with his stomach.  He states that he then ate dinner and had multiple episodes of vomiting.  He states approximately 1 to 2 hours later he got up to go to the bathroom to vomit again however he fell.  He states he fell because he was weak, did not hit his head, did not lose consciousness.  Since then patient has had some cramping abdominal pain associate with nausea and vomiting.  Denies any blood in the vomitus.  Patient is on Eliquis, has history of Whipple procedure.    PAST MEDICAL / SURGICAL / SOCIAL / FAMILY HISTORY      has a past medical history of Aortic insufficiency, CAD (coronary artery disease), Chronic kidney disease, Diabetes mellitus (HCC), Frozen shoulder, GERD (gastroesophageal reflux disease), Hx of blood clots, Hyperlipidemia, Hypertension, Kidney calculi, Kidney disease, Neuropathy, Obstructive sleep apnea on CPAP, Osteoarthritis, and Wears partial dentures.       has a past surgical history that includes Tonsillectomy; Rectal surgery; shoulder surgery; Achilles tendon surgery (Left); Cataract removal with implant (Bilateral); Rotator cuff repair (Left); joint replacement (Left); Appendectomy; Dental surgery; and Coronary artery bypass graft  Mental Status: He is alert.           DDX/DIAGNOSTIC RESULTS / EMERGENCY DEPARTMENT COURSE / [facility]     Medical Decision Making  Jose M Tovar is a 78 y.o. male who presents with abdominal pain, nausea, vomiting,.  Patient is GCS 15, nontoxic appearing, not in acute distress, speaking full sentences, able to ambulate under their own power.  Patient is febrile, normotensive, nontachycardic, satting well on room air.  Examination reveals lungs are clear to auscultation bilaterally.  Abdomen soft nontender.  Peripheral pulses are 2+ throughout.  Differential diagnosis includes sepsis, intra-abdominal infection, viral GI illness, intracranial pathology.  Will obtain septic workup, cardiac workup, CT head, CT C-spine.  Patient will likely require admission      Amount and/or Complexity of Data Reviewed  Labs: ordered.  Radiology: ordered.  ECG/medicine tests: ordered.    Risk  OTC drugs.  Prescription drug management.        EKG      All EKG's are interpreted by the Emergency Department Physician who either signs or Co-signs this chart in the absence of a cardiologist.    EMERGENCY DEPARTMENT COURSE:           PROCEDURES:      CONSULTS:  None    CRITICAL CARE:  There was significant risk of life threatening deterioration of patient's condition requiring my direct management. Critical care time  minutes, excluding any documented procedures.    FINAL IMPRESSION      1. Nausea and vomiting, unspecified vomiting type          DISPOSITION / PLAN     DISPOSITION    Signed out to Dr. Hawk, pending CT scans    PATIENT REFERRED TO:  No follow-up provider specified.    DISCHARGE MEDICATIONS:  New Prescriptions    No medications on file       Fermin Ann MD  Emergency Medicine Resident    (Please note that portions of this note were completed with a voice recognition program.  Efforts were made to edit the dictations but occasionally words are mis-transcribed.)

## 2024-05-10 NOTE — CARE COORDINATION
Case Management Assessment  Initial Evaluation    Date/Time of Evaluation: 5/10/2024 9:40 AM  Assessment Completed by: Meghann Rose RN    If patient is discharged prior to next notation, then this note serves as note for discharge by case management.    Patient Name: Jose M Tovar                   YOB: 1945  Diagnosis: Near syncope [R55]  Anemia, unspecified type [D64.9]  Nausea and vomiting, unspecified vomiting type [R11.2]                   Date / Time: 5/9/2024 10:34 PM    Patient Admission Status: Inpatient   Readmission Risk (Low < 19, Mod (19-27), High > 27): Readmission Risk Score: 13.4    Current PCP: Ger Kidd MD  PCP verified by CM? Yes    Chart Reviewed: Yes      History Provided by: Patient  Patient Orientation: Alert and Oriented    Patient Cognition: Alert    Hospitalization in the last 30 days (Readmission):  No    If yes, Readmission Assessment in CM Navigator will be completed.    Advance Directives:      Code Status: Full Code   Patient's Primary Decision Maker is: Legal Next of Kin      Discharge Planning:    Patient lives with: Spouse/Significant Other Type of Home: House  Primary Care Giver: Self  Patient Support Systems include: Spouse/Significant Other   Current Financial resources: Medicare  Current community resources: None  Current services prior to admission: Durable Medical Equipment            Current DME: Glucometer, Shower Chair            Type of Home Care services:  None    ADLS  Prior functional level: Independent in ADLs/IADLs  Current functional level: Independent in ADLs/IADLs    PT AM-PAC:   /24  OT AM-PAC:   /24    Family can provide assistance at DC: Yes  Would you like Case Management to discuss the discharge plan with any other family members/significant others, and if so, who? Yes (Spouse; Kristin)  Plans to Return to Present Housing: Yes  Other Identified Issues/Barriers to RETURNING to current housing: NA  Potential Assistance needed at  discharge: N/A            Potential DME:    Patient expects to discharge to: House  Plan for transportation at discharge: Family    Financial    Payor: MEDICARE / Plan: MEDICARE PART A AND B / Product Type: *No Product type* /     Does insurance require precert for SNF: Yes    Potential assistance Purchasing Medications: No  Meds-to-Beds request:        [id number] - [place], [place] - [address] [facility] -  [phone] - F [phone]  [address] [place] [address]  Phone: [phone] Fax: [phone]    [facility] - [place], [place] - [address] [facility] [facility] - P [phone] - F [phone]  [address] [facility] [address]  Phone: [phone] Fax: [phone]    [id number] - [place], [place] - [address] [facility] -  [phone] - F [phone]  [address] [place] [address]  Phone: [phone] Fax: [phone]      Notes:    Factors facilitating achievement of predicted outcomes: Family support, Motivated, Cooperative, and Pleasant    Barriers to discharge: NA    Additional Case Management Notes: The patient is from [address] [place] home w/spouse, independent, drives.     DME: Glucometer, [place].     VNS: Denies.    Patient sustained a fall, experienced dizziness. PT/OT ordered. Echo ordered. Orthostatics positive.    The Plan for Transition of Care is related to the following treatment goals of Near syncope [R55]  Anemia, unspecified type [D64.9]  Nausea and vomiting, unspecified vomiting type [R11.2]    IF APPLICABLE: The Patient and/or patient representative Jose M and his family were provided with a choice of provider and agrees with the discharge plan. Freedom of choice list with basic dialogue that supports the patient's individualized plan of care/goals and shares the quality data associated with the providers was provided to: Patient   Patient Representative Name:       The Patient and/or Patient Representative Agree with the Discharge Plan? Yes    Meghann

## 2024-05-10 NOTE — PLAN OF CARE
Problem: Discharge Planning  Goal: Discharge to home or other facility with appropriate resources  Outcome: Progressing  Flowsheets (Taken 5/10/2024 0725)  Discharge to home or other facility with appropriate resources:   Identify barriers to discharge with patient and caregiver   Arrange for needed discharge resources and transportation as appropriate   Identify discharge learning needs (meds, wound care, etc)   Arrange for interpreters to assist at discharge as needed   Refer to discharge planning if patient needs post-hospital services based on physician order or complex needs related to functional status, cognitive ability or social support system     Problem: Safety - Adult  Goal: Free from fall injury  Outcome: Progressing  Flowsheets (Taken 5/10/2024 0725)  Free From Fall Injury:   Instruct family/caregiver on patient safety   Based on caregiver fall risk screen, instruct family/caregiver to ask for assistance with transferring infant if caregiver noted to have fall risk factors     Problem: Chronic Conditions and Co-morbidities  Goal: Patient's chronic conditions and co-morbidity symptoms are monitored and maintained or improved  Outcome: Progressing  Flowsheets (Taken 5/10/2024 0725)  Care Plan - Patient's Chronic Conditions and Co-Morbidity Symptoms are Monitored and Maintained or Improved:   Monitor and assess patient's chronic conditions and comorbid symptoms for stability, deterioration, or improvement   Collaborate with multidisciplinary team to address chronic and comorbid conditions and prevent exacerbation or deterioration   Update acute care plan with appropriate goals if chronic or comorbid symptoms are exacerbated and prevent overall improvement and discharge     Problem: Pain  Goal: Verbalizes/displays adequate comfort level or baseline comfort level  Outcome: Progressing  Flowsheets (Taken 5/10/2024 0723)  Verbalizes/displays adequate comfort level or baseline comfort level:   Encourage  patient to monitor pain and request assistance   Assess pain using appropriate pain scale   Administer analgesics based on type and severity of pain and evaluate response   Implement non-pharmacological measures as appropriate and evaluate response   Consider cultural and social influences on pain and pain management   Notify Licensed Independent Practitioner if interventions unsuccessful or patient reports new pain

## 2024-05-10 NOTE — ED PROVIDER NOTES
EMERGENCY DEPARTMENT ENCOUNTER   ATTENDING ATTESTATION     Pt Name: Jose M Tovar  MRN: [id number]  Birthdate 1945  Date of evaluation: 5/9/24       Jose M Tovar is a 78 y.o. male who presents with Emesis and Fall (About an hour ago patient went to the bathroom in his home and became lightheaded and fell to the floor, no LOC. Bruising to nose and upper lip)    After lunch wasn't feeling well    Tonight vomiting and then felt weak and fell    Hit his head    Plan is infectious workup and ct head    MDM:     Patient's workup did not show any obvious source of infection    May have a viral gastroenteritis    He does have softer blood pressures and had a near syncopal event    I feel he is high risk will admit for continued monitoring inpatient    Vitals:   Vitals:    05/10/24 0214 05/10/24 0230 05/10/24 0245 05/10/24 0300   BP: (!) 98/47 (!) 92/52 (!) 104/57 108/67   Pulse: 80 80 80 80   Resp: 21 21 23 22   Temp:       TempSrc:       SpO2: 95% 95% 97% 92%   Weight:       Height:             I personally saw and examined the patient. I have reviewed and agree with the resident's findings, including all diagnostic interpretations and treatment plan as written. I was present for the key portions of any procedures performed and the inclusive time noted for any critical care statement.    Denton Hawk MD  Attending Emergency Physician           Denton Hawk MD  05/10/24 0808

## 2024-05-10 NOTE — PLAN OF CARE
Please have patient or POA answer all MRI Screening questions in Epic. Pt must in hospital clothes. Pt must be NPO for approximately 6 hours prior to exam. If any questions please call [phone].Thanks!

## 2024-05-10 NOTE — H&P
[facility]   IN-PATIENT SERVICE   [facility]    HISTORY AND PHYSICAL EXAMINATION            Date:   5/10/2024  Patient name:  Jose M Tovar  Date of admission:  5/9/2024 10:34 PM  MRN:   [id number]  Account:  [id number]  YOB: 1945  PCP:    Ger Kidd MD  Room:   [address] [place] [address]  Code Status:    Full Code    Chief Complaint:     Chief Complaint   Patient presents with    Emesis    Fall     About an hour ago patient went to the bathroom in his home and became lightheaded and fell to the floor, no LOC. Bruising to nose and upper lip       History Obtained From:     patient    History of Present Illness:     The patient is a 78 y.o.  Non- / non  male who presents with Emesis and Fall (About an hour ago patient went to the bathroom in his home and became lightheaded and fell to the floor, no LOC. Bruising to nose and upper lip)   and he is admitted to the hospital for the management of nausea vomiting.      Jose M Tovar is a 78 y.o. Non- / non  male who presents with Emesis and Fall and is admitted to the hospital for the management of Near syncope. Medical history is significant for CAD, CKD stage 3b, HTN, HLD, Hx of DVT on Eliquis,  Diabetes Mellitus.    According to patient, he felt nauseated after eating lunch. Then after dinner he had multiple episodes of vomiting. A few hours later when getting up to restroom, felt lightheaded and dizzy resulting in falling forward. Denies loss of consciousness.  He has bruising to upper lip and nose, denies any other injuries. Reporting generalized weakness. CT head and cervical spine negative for acute abnormality. EKG NSR, no ST or T-wave changes. Troponin flat at 15. BNP 2500. Denies chest pain. No recent cardiac testing. Creatinine 1.3, baseline with hx of CKD 3b. Hgb 8.8. Previous levels 12-13. Denies signs or symptoms of bleeding. Stool for OB negative. CT abdomen pelvis 3 cm cystic mass near superior

## 2024-05-11 VITALS
HEART RATE: 74 BPM | SYSTOLIC BLOOD PRESSURE: 144 MMHG | HEIGHT: 66 IN | TEMPERATURE: 98.2 F | DIASTOLIC BLOOD PRESSURE: 75 MMHG | BODY MASS INDEX: 23.14 KG/M2 | OXYGEN SATURATION: 97 % | WEIGHT: 143.96 LBS | RESPIRATION RATE: 18 BRPM

## 2024-05-11 LAB
ALBUMIN SERPL-MCNC: 3 G/DL (ref 3.5–5.2)
ALP SERPL-CCNC: 39 U/L (ref 40–129)
ALT SERPL-CCNC: 39 U/L (ref 5–41)
ANION GAP SERPL CALCULATED.3IONS-SCNC: 8 MMOL/L (ref 9–17)
AST SERPL-CCNC: 35 U/L
BASOPHILS # BLD: 0.03 K/UL (ref 0–0.2)
BASOPHILS NFR BLD: 1 % (ref 0–2)
BILIRUB DIRECT SERPL-MCNC: 0.1 MG/DL
BILIRUB INDIRECT SERPL-MCNC: 0.2 MG/DL (ref 0–1)
BILIRUB SERPL-MCNC: 0.3 MG/DL (ref 0.3–1.2)
BUN SERPL-MCNC: 26 MG/DL (ref 8–23)
CALCIUM SERPL-MCNC: 8.6 MG/DL (ref 8.6–10.4)
CHLORIDE SERPL-SCNC: 103 MMOL/L (ref 98–107)
CO2 SERPL-SCNC: 25 MMOL/L (ref 20–31)
CREAT SERPL-MCNC: 1.1 MG/DL (ref 0.7–1.2)
EOSINOPHIL # BLD: 0.08 K/UL (ref 0–0.4)
EOSINOPHILS RELATIVE PERCENT: 3 % (ref 0–4)
ERYTHROCYTE [DISTWIDTH] IN BLOOD BY AUTOMATED COUNT: 18.2 % (ref 11.5–14.9)
GFR, ESTIMATED: 69 ML/MIN/1.73M2
GLUCOSE BLD-MCNC: 122 MG/DL (ref 75–110)
GLUCOSE BLD-MCNC: 171 MG/DL (ref 75–110)
GLUCOSE BLD-MCNC: 182 MG/DL (ref 75–110)
GLUCOSE SERPL-MCNC: 127 MG/DL (ref 70–99)
HCT VFR BLD AUTO: 26.1 % (ref 41–53)
HGB BLD-MCNC: 8.4 G/DL (ref 13.5–17.5)
LYMPHOCYTES NFR BLD: 0.73 K/UL (ref 1–4.8)
LYMPHOCYTES RELATIVE PERCENT: 26 % (ref 24–44)
MCH RBC QN AUTO: 24.4 PG (ref 26–34)
MCHC RBC AUTO-ENTMCNC: 32.1 G/DL (ref 31–37)
MCV RBC AUTO: 76.1 FL (ref 80–100)
MONOCYTES NFR BLD: 0.45 K/UL (ref 0.1–1.3)
MONOCYTES NFR BLD: 16 % (ref 1–7)
MORPHOLOGY: ABNORMAL
NEUTROPHILS NFR BLD: 54 % (ref 36–66)
NEUTS SEG NFR BLD: 1.51 K/UL (ref 1.3–9.1)
PLATELET # BLD AUTO: 99 K/UL (ref 150–450)
PMV BLD AUTO: 9.7 FL (ref 6–12)
POTASSIUM SERPL-SCNC: 3.9 MMOL/L (ref 3.7–5.3)
PROT SERPL-MCNC: 5.2 G/DL (ref 6.4–8.3)
RBC # BLD AUTO: 3.43 M/UL (ref 4.5–5.9)
SODIUM SERPL-SCNC: 136 MMOL/L (ref 135–144)
TSH SERPL DL<=0.05 MIU/L-ACNC: 2.59 UIU/ML (ref 0.3–5)
WBC OTHER # BLD: 2.8 K/UL (ref 3.5–11)

## 2024-05-11 PROCEDURE — 99239 HOSP IP/OBS DSCHRG MGMT >30: CPT | Performed by: INTERNAL MEDICINE

## 2024-05-11 PROCEDURE — 80076 HEPATIC FUNCTION PANEL: CPT

## 2024-05-11 PROCEDURE — 80048 BASIC METABOLIC PNL TOTAL CA: CPT

## 2024-05-11 PROCEDURE — 6360000002 HC RX W HCPCS: Performed by: INTERNAL MEDICINE

## 2024-05-11 PROCEDURE — APPSS30 APP SPLIT SHARED TIME 16-30 MINUTES: Performed by: NURSE PRACTITIONER

## 2024-05-11 PROCEDURE — 2580000003 HC RX 258: Performed by: NURSE PRACTITIONER

## 2024-05-11 PROCEDURE — 99232 SBSQ HOSP IP/OBS MODERATE 35: CPT | Performed by: INTERNAL MEDICINE

## 2024-05-11 PROCEDURE — 6370000000 HC RX 637 (ALT 250 FOR IP): Performed by: NURSE PRACTITIONER

## 2024-05-11 PROCEDURE — 82947 ASSAY GLUCOSE BLOOD QUANT: CPT

## 2024-05-11 PROCEDURE — 36415 COLL VENOUS BLD VENIPUNCTURE: CPT

## 2024-05-11 PROCEDURE — 84443 ASSAY THYROID STIM HORMONE: CPT

## 2024-05-11 PROCEDURE — 85025 COMPLETE CBC W/AUTO DIFF WBC: CPT

## 2024-05-11 PROCEDURE — 2580000003 HC RX 258: Performed by: INTERNAL MEDICINE

## 2024-05-11 RX ORDER — FERROUS SULFATE 325(65) MG
325 TABLET ORAL
Qty: 90 TABLET | Refills: 1 | Status: SHIPPED | OUTPATIENT
Start: 2024-05-11

## 2024-05-11 RX ADMIN — PANCRELIPASE LIPASE, PANCRELIPASE PROTEASE, PANCRELIPASE AMYLASE 15000 UNITS: 15000; 47000; 63000 CAPSULE, DELAYED RELEASE ORAL at 08:35

## 2024-05-11 RX ADMIN — PANCRELIPASE LIPASE, PANCRELIPASE PROTEASE, PANCRELIPASE AMYLASE 15000 UNITS: 15000; 47000; 63000 CAPSULE, DELAYED RELEASE ORAL at 12:56

## 2024-05-11 RX ADMIN — TROSPIUM CHLORIDE 20 MG: 20 TABLET, FILM COATED ORAL at 06:16

## 2024-05-11 RX ADMIN — SODIUM CHLORIDE, PRESERVATIVE FREE 10 ML: 5 INJECTION INTRAVENOUS at 08:32

## 2024-05-11 RX ADMIN — ATORVASTATIN CALCIUM 20 MG: 20 TABLET, FILM COATED ORAL at 08:31

## 2024-05-11 RX ADMIN — TROSPIUM CHLORIDE 20 MG: 20 TABLET, FILM COATED ORAL at 16:45

## 2024-05-11 RX ADMIN — ACETAMINOPHEN 650 MG: 325 TABLET ORAL at 08:31

## 2024-05-11 RX ADMIN — PANTOPRAZOLE SODIUM 40 MG: 40 TABLET, DELAYED RELEASE ORAL at 06:16

## 2024-05-11 RX ADMIN — SODIUM CHLORIDE 300 MG: 9 INJECTION, SOLUTION INTRAVENOUS at 13:00

## 2024-05-11 RX ADMIN — SODIUM CHLORIDE, POTASSIUM CHLORIDE, SODIUM LACTATE AND CALCIUM CHLORIDE: 600; 310; 30; 20 INJECTION, SOLUTION INTRAVENOUS at 06:17

## 2024-05-11 ASSESSMENT — PAIN DESCRIPTION - LOCATION: LOCATION: HEAD

## 2024-05-11 ASSESSMENT — PAIN SCALES - GENERAL: PAINLEVEL_OUTOF10: 6

## 2024-05-11 NOTE — PLAN OF CARE
Problem: Discharge Planning  Goal: Discharge to home or other facility with appropriate resources  Outcome: Completed     Problem: Safety - Adult  Goal: Free from fall injury  Outcome: Completed     Problem: Chronic Conditions and Co-morbidities  Goal: Patient's chronic conditions and co-morbidity symptoms are monitored and maintained or improved  Outcome: Completed     Problem: Pain  Goal: Verbalizes/displays adequate comfort level or baseline comfort level  Outcome: Completed     Problem: Neurosensory - Adult  Goal: Achieves maximal functionality and self care  Outcome: Completed     Problem: Skin/Tissue Integrity - Adult  Goal: Skin integrity remains intact  Outcome: Completed     Problem: Musculoskeletal - Adult  Goal: Return mobility to safest level of function  Outcome: Completed  Goal: Return ADL status to a safe level of function  Outcome: Completed

## 2024-05-11 NOTE — CARE COORDINATION
ONGOING DISCHARGE PLAN:    Patient is alert and oriented x4.    Spoke with patient regarding discharge plan and patient confirms that plan is still to go home with spouse.       DME: glucometer, [place]    VNS: none and denies needs    GI following.     Per patient he may need to go to [facility] for procedure.     IV Venofer    Will continue to follow for additional discharge needs.    If patient is discharged prior to next notation, then this note serves as note for discharge by case management.    Electronically signed by Madeleine Mann RN on 5/11/2024 at 12:52 PM

## 2024-05-11 NOTE — PLAN OF CARE
Problem: Discharge Planning  Goal: Discharge to home or other facility with appropriate resources  5/10/2024 2352 by Vaishnavi Winchester RN  Outcome: Progressing  Flowsheets (Taken 5/10/2024 2000)  Discharge to home or other facility with appropriate resources: Identify barriers to discharge with patient and caregiver  5/10/2024 1619 by Viv Pineda RN  Outcome: Progressing  Flowsheets (Taken 5/10/2024 0725)  Discharge to home or other facility with appropriate resources:   Identify barriers to discharge with patient and caregiver   Arrange for needed discharge resources and transportation as appropriate   Identify discharge learning needs (meds, wound care, etc)   Arrange for interpreters to assist at discharge as needed   Refer to discharge planning if patient needs post-hospital services based on physician order or complex needs related to functional status, cognitive ability or social support system     Problem: Safety - Adult  Goal: Free from fall injury  5/10/2024 2352 by Vaishnavi Winchester RN  Outcome: Progressing  5/10/2024 1619 by Viv Pineda RN  Outcome: Progressing  Flowsheets (Taken 5/10/2024 0725)  Free From Fall Injury:   Instruct family/caregiver on patient safety   Based on caregiver fall risk screen, instruct family/caregiver to ask for assistance with transferring infant if caregiver noted to have fall risk factors     Problem: Chronic Conditions and Co-morbidities  Goal: Patient's chronic conditions and co-morbidity symptoms are monitored and maintained or improved  5/10/2024 2352 by Vaishnavi Winchester RN  Outcome: Progressing  Flowsheets (Taken 5/10/2024 2000)  Care Plan - Patient's Chronic Conditions and Co-Morbidity Symptoms are Monitored and Maintained or Improved: Monitor and assess patient's chronic conditions and comorbid symptoms for stability, deterioration, or improvement  5/10/2024 1619 by Viv Pineda RN  Outcome: Progressing  Flowsheets (Taken 5/10/2024 0725)  Care Plan -

## 2024-05-11 NOTE — PROGRESS NOTES
GI Progress notes    5/11/2024   12:04 PM    Name:  Jose M Tovar  MRN:    [id number]     Acct:     [id number]   [id number]     Admit Date: 5/9/2024 10:34 PM  PCP: Ger Kidd MD    Subjective:     C/C:   Chief Complaint   Patient presents with    Emesis    Fall     About an hour ago patient went to the bathroom in his home and became lightheaded and fell to the floor, no LOC. Bruising to nose and upper lip       Interval History: Status: not changed.     Patient seen and examined  No acute events overnight.  Denies abdominal pain, nausea, vomiting  Tolerating diet  VSS, afebrile.    ROS:  Constitutional: negative for chills, fevers and sweats  Gastrointestinal: negative for abdominal pain, constipation, diarrhea, nausea and vomiting  Neurological: negative for dizziness and headaches    Medications:     Allergies:   Allergies   Allergen Reactions    Coumadin [Warfarin Sodium] Swelling and Rash    Morphine Nausea And Vomiting, Swelling and Rash    Doxycycline Rash       Current Meds: iron sucrose (VENOFER) injection 300 mg, Q24H  iopamidol (ISOVUE-370) 76 % injection 100 mL, ONCE PRN  [Held by provider] amLODIPine (NORVASC) tablet 5 mg, Daily  [Held by provider] apixaban (ELIQUIS) tablet 2.5 mg, BID  atorvastatin (LIPITOR) tablet 20 mg, Daily  insulin glargine (LANTUS) injection vial 10 Units, Nightly  glucose chewable tablet 16 g, PRN  dextrose bolus 10% 125 mL, PRN   Or  dextrose bolus 10% 250 mL, PRN  glucagon injection 1 mg, PRN  dextrose 10 % infusion, Continuous PRN  insulin lispro (HUMALOG) injection vial 0-8 Units, TID WC  insulin lispro (HUMALOG) injection vial 0-4 Units, Nightly  lipase-protease-amylas (ZENPEP 15,000) delayed release capsule 15,000 Units, TID WC  [Held by provider] metoprolol (LOPRESSOR) tablet 100 mg, BID  [Held by provider] losartan (COZAAR) tablet 25 mg, Daily  pantoprazole (PROTONIX) tablet 40 mg, QAM AC  trospium (SANCTURA) tablet 20 mg, BID AC  sodium  chloride flush 0.9 % injection 5-40 mL, 2 times per day  sodium chloride flush 0.9 % injection 5-40 mL, PRN  0.9 % sodium chloride infusion, PRN  potassium chloride (KLOR-CON M) extended release tablet 40 mEq, PRN   Or  potassium bicarb-citric acid (EFFER-K) effervescent tablet 40 mEq, PRN   Or  potassium chloride 10 mEq/100 mL IVPB (Peripheral Line), PRN  magnesium sulfate 2000 mg in water 50 mL IVPB, PRN  ondansetron (ZOFRAN-ODT) disintegrating tablet 4 mg, Q8H PRN   Or  ondansetron (ZOFRAN) injection 4 mg, Q6H PRN  polyethylene glycol (GLYCOLAX) packet 17 g, Daily PRN  acetaminophen (TYLENOL) tablet 650 mg, Q6H PRN   Or  acetaminophen (TYLENOL) suppository 650 mg, Q6H PRN  perflutren lipid microspheres (DEFINITY) injection 1.5 mL, ONCE PRN  [Held by provider] aspirin chewable tablet 81 mg, Daily  lactated ringers IV soln infusion, Continuous  sodium chloride flush 0.9 % injection 10 mL, PRN        Data:     Code Status:  Full Code    Family History   Problem Relation Age of Onset    Heart Disease Mother     High Blood Pressure Mother     Diabetes Mother     Diabetes Sister     Heart Disease Sister     High Blood Pressure Sister     Diabetes Brother        Social History     Socioeconomic History    Marital status:      Spouse name: Not on file    Number of children: Not on file    Years of education: Not on file    Highest education level: Not on file   Occupational History    Not on file   Tobacco Use    Smoking status: Never    Smokeless tobacco: Never   Vaping Use    Vaping Use: Never used   Substance and Sexual Activity    Alcohol use: No     Alcohol/week: 0.0 standard drinks of alcohol    Drug use: No    Sexual activity: Not on file   Other Topics Concern    Not on file   Social History Narrative    Not on file     Social Determinants of Health     Financial Resource Strain: Not on file   Food Insecurity: No Food Insecurity (5/10/2024)    Hunger Vital Sign     Worried About Running Out of Food in the

## 2024-05-11 NOTE — DISCHARGE INSTRUCTIONS
Please hold amlodipine for now, check pressures at home.  If blood pressures greater than 160 systolic may resume taking amlodipine at 5 mg p.o. daily.  Is otherwise contact your primary care physician prior to initiating amlodipine.  Repeat blood counts in 1 week.  If you notice any blood in the stools or have nausea vomiting abdominal discomfort please return to the emergency room  Contact your GI physician at [facility] on Monday to report about findings so that the ERCP and EGD can be done soon.  You may also require a colonoscopy at the same time.

## 2024-05-11 NOTE — CONSULTS
Department of Neurosurgery  Attending Consult Note        Reason for Consult:  Cervical spinal stenois  Requesting Physician:  Dr. Lindy Vann    CHIEF COMPLAINT:  Fall    History Obtained From:  patient, electronic medical record    HISTORY OF PRESENT ILLNESS:                The patient is a 78 y.o. male who presents after having a fall at home in the bathroom.  The fall occurred yesterday evening.  He had had several episodes of emesis at home that evening.  He has a history of pancreatic cysts and has undergone a Whipple procedure in the past.  He does have at least 1 remaining cyst that is followed with periodic imaging.  Because of his fall he had a CT of the head and cervical spine, and there was evidence of cervical spinal stenosis, which prompted neurosurgical consultation.  The patient states that prior to falling at home he felt a heaviness in the arms and legs.  This was a new problem that developed suddenly yesterday evening.  He has not had a similar episode in the past.  Aside from this episode he denies difficulty with feeling weak in the arms or legs.  He denies difficulty with fine motor tasks, such as buttoning buttons, tying shoes, or handwriting.  He has not noticed any difficulty with his balance.  He does have some numbness in the hands and feet, but has been diagnosed with diabetic neuropathy.  This diagnosis was made quite a few years ago.  He has a fairly extensive medical history, including hypertension, chronic kidney disease, history of deep venous thrombosis, use of anticoagulation, and the aforementioned issues with the pancreas.  He also has extensive difficulty with arthritis, and states that he has been told he would benefit from a shoulder replacement on the left side, but he has chosen not to pursue this surgery.    Past Medical History:        Diagnosis Date    Aortic insufficiency     CAD (coronary artery disease)     leaking vavle, 50 % blockage    Chronic kidney disease      AC  trospium (SANCTURA) tablet 20 mg, 20 mg, Oral, BID AC  sodium chloride flush 0.9 % injection 5-40 mL, 5-40 mL, IntraVENous, 2 times per day  sodium chloride flush 0.9 % injection 5-40 mL, 5-40 mL, IntraVENous, PRN  0.9 % sodium chloride infusion, , IntraVENous, PRN  potassium chloride (KLOR-CON M) extended release tablet 40 mEq, 40 mEq, Oral, PRN **OR** potassium bicarb-citric acid (EFFER-K) effervescent tablet 40 mEq, 40 mEq, Oral, PRN **OR** potassium chloride 10 mEq/100 mL IVPB (Peripheral Line), 10 mEq, IntraVENous, PRN  magnesium sulfate 2000 mg in water 50 mL IVPB, 2,000 mg, IntraVENous, PRN  ondansetron (ZOFRAN-ODT) disintegrating tablet 4 mg, 4 mg, Oral, Q8H PRN **OR** ondansetron (ZOFRAN) injection 4 mg, 4 mg, IntraVENous, Q6H PRN  polyethylene glycol (GLYCOLAX) packet 17 g, 17 g, Oral, Daily PRN  acetaminophen (TYLENOL) tablet 650 mg, 650 mg, Oral, Q6H PRN **OR** acetaminophen (TYLENOL) suppository 650 mg, 650 mg, Rectal, Q6H PRN  perflutren lipid microspheres (DEFINITY) injection 1.5 mL, 1.5 mL, IntraVENous, ONCE PRN  [Held by provider] aspirin chewable tablet 81 mg, 81 mg, Oral, Daily  lactated ringers IV soln infusion, , IntraVENous, Continuous  sodium chloride flush 0.9 % injection 10 mL, 10 mL, IntraVENous, PRN  Allergies:  Coumadin [warfarin sodium], Morphine, and Doxycycline    Social History:   TOBACCO:   reports that he has never smoked. He has never used smokeless tobacco.  ETOH:   reports no history of alcohol use.  DRUGS:   reports no history of drug use.  Family History:       Problem Relation Age of Onset    Heart Disease Mother     High Blood Pressure Mother     Diabetes Mother     Diabetes Sister     Heart Disease Sister     High Blood Pressure Sister     Diabetes Brother      REVIEW OF SYSTEMS:  As discussed in history of present illness    PHYSICAL EXAM:  VITALS:  BP (!) 119/53   Pulse 78   Temp 98.6 °F (37 °C) (Oral)   Resp 18   Ht 1.676 m (5' 6\")   Wt 65.3 kg (143 lb 15.4 oz)    SpO2 98%   BMI 23.24 kg/m²     He is easily aroused from sleep.  He answers questions appropriately with clear and fluent speech.  His extraocular movements are intact.  The face moves symmetrically.  He does not have pronator drift.  He moves his extremities well with 5/5 strength in all major muscle groups.  There is a little bit of difficulty with motor testing in the left upper extremity due to his shoulder pain, but he is able to at least briefly provide full resistance.  Sensation is intact to light touch throughout, but subjectively altered in the hands and feet.  His reflexes are normal at the patella bilaterally.  Carlos sign is absent bilaterally.  The toes are downgoing bilaterally.  I was unable to elicit clonus at the ankles.  He does well with finger-to-nose testing.    DATA:  I personally reviewed CT scans of the head and cervical spine, as well as the abdomen and pelvis.  I also reviewed the radiologist's reports for each.  These were all performed here during this admission.  There is extensive degenerative change in the visualized portions of the spine.  The cervical spine CT includes the spine down to portions of the fourth thoracic vertebra.  The most significant finding is a disc osteophyte complex at C3-4 that is causing some canal stenosis.  It is likely that there is some spinal cord compression at this level, but this is difficult to say with certainty on a noncontrasted CT.  I do not appreciate any acute injury in the cervical or upper thoracic spine.  The CT of the abdomen and pelvis again shows diffuse degenerative change.  This study includes the entirety of the sacrum and lumbar spine as well as the thoracic spine up to portions of the seventh thoracic vertebra.  There are pars defects at L5 bilaterally that are associated with a minimal anterolisthesis of L5 relative to S1.  There were also extensive degenerative changes, which are most pronounced in the facets at L4-5 and L5-S1.

## 2024-05-12 LAB
MICROORGANISM SPEC CULT: NORMAL
MICROORGANISM SPEC CULT: NORMAL
SERVICE CMNT-IMP: NORMAL
SERVICE CMNT-IMP: NORMAL
SPECIMEN DESCRIPTION: NORMAL
SPECIMEN DESCRIPTION: NORMAL

## 2024-05-22 NOTE — PROGRESS NOTES
Physician Progress Note      PATIENT:               JAC CASTILLO  CSN #:                  [id number]  :                       1945  ADMIT DATE:       2024 10:34 PM  DISCH DATE:        2024 6:11 PM  RESPONDING  PROVIDER #:        Lindy Vann MD          QUERY TEXT:    Patient admitted with syncope noted to have some hypotension 92/52, 97/52 and   bp meds held. If possible, please document in progress notes and discharge   summary after study the etiology of the syncope:            The medical record reflects the following:  Risk Factors: 78yr, nausea and vomiting, hypotension,  Clinical Indicators: lightheaded and fell to the floor, hypotension 97/50,   92/52  Treatment: telemetry, Check orthostatic VS q shift, continuous IVF,   metoprolol, Cozaar, Norvasc held for hypotension per [email or web address] [email or web address]  Options provided:  -- Syncope due to other hypotension  -- Syncope due to orthostatic hypotension secondary to, Please specify cause.  -- Syncope due to, Please specify cause.  -- Other - I will add my own diagnosis  -- Disagree - Not applicable / Not valid  -- Disagree - Clinically unable to determine / Unknown  -- Refer to Clinical Documentation Reviewer    PROVIDER RESPONSE TEXT:    The syncope is due to other hypotension.    Query created by: LAZARUS GEORGE on 2024 4:21 PM      Electronically signed by:  Lindy Vann MD 2024 7:52 AM

## 2024-05-23 NOTE — DISCHARGE SUMMARY
achievable. COMPARISON: None. HISTORY: ORDERING SYSTEM PROVIDED HISTORY: fall TECHNOLOGIST PROVIDED HISTORY: fall Decision Support Exception - unselect if not a suspected or confirmed emergency medical condition->Emergency Medical Condition (MA) Reason for Exam: fall Additional signs and symptoms: pt states headache and neckpain. neg loc Relevant Medical/Surgical History: pt states headache and neckpain. neg loc FINDINGS: BONES/ALIGNMENT: There is no acute fracture or suspect osseous lesion. Vertebral body heights are maintained.  There is 3 mm degenerative retrolisthesis of C3 on C4.  No traumatic malalignment is evident. Craniocervical and C1-2 relationships are normal. DEGENERATIVE CHANGES: Severe C3-4 and moderate C5-6 degenerative disc height loss with endplate spurring and sclerotic endplate changes.  Severe spinal canal stenosis at C3-4 secondary to a posterior disc osteophyte complex. Moderate multilevel facet hypertrophy. SOFT TISSUES: No acute paraspinal soft tissue abnormality.     1. No acute abnormality of the cervical spine. 2. Multilevel degenerative changes as above with severe C3-4 spinal canal stenosis.     XR CHEST PORTABLE    Result Date: 5/10/2024  EXAMINATION: ONE XRAY VIEW OF THE CHEST 5/9/2024 10:45 pm COMPARISON: None. HISTORY: ORDERING SYSTEM PROVIDED HISTORY: fall TECHNOLOGIST PROVIDED HISTORY: fall Reason for Exam: Fall, emesis, fever Additional signs and symptoms: Fall, emesis, fever Relevant Medical/Surgical History: Fall, emesis, fever FINDINGS: Heart size and pulmonary vasculature are normal.  The lungs are clear and normally expanded.  Surrounding osseous and soft tissue structures are unremarkable.     Normal examination.         Consultations:    Consults:     Final Specialist Recommendations/Findings:   IP CONSULT TO NEUROSURGERY  IP CONSULT TO GI      The patient was seen and examined on day of discharge and this discharge summary is in conjunction with any daily progress note

## 2024-08-15 ENCOUNTER — OFFICE VISIT (OUTPATIENT)
Dept: ORTHOPEDIC SURGERY | Age: 79
End: 2024-08-15
Payer: MEDICARE

## 2024-08-15 ENCOUNTER — TELEPHONE (OUTPATIENT)
Dept: INTERVENTIONAL RADIOLOGY/VASCULAR | Age: 79
End: 2024-08-15

## 2024-08-15 VITALS — BODY MASS INDEX: 23.3 KG/M2 | WEIGHT: 145 LBS | RESPIRATION RATE: 14 BRPM | HEIGHT: 66 IN

## 2024-08-15 DIAGNOSIS — M16.11 OSTEOARTHRITIS OF RIGHT HIP, UNSPECIFIED OSTEOARTHRITIS TYPE: Primary | ICD-10-CM

## 2024-08-15 PROCEDURE — G8428 CUR MEDS NOT DOCUMENT: HCPCS | Performed by: PHYSICIAN ASSISTANT

## 2024-08-15 PROCEDURE — 99213 OFFICE O/P EST LOW 20 MIN: CPT | Performed by: PHYSICIAN ASSISTANT

## 2024-08-15 PROCEDURE — 1036F TOBACCO NON-USER: CPT | Performed by: PHYSICIAN ASSISTANT

## 2024-08-15 PROCEDURE — 1123F ACP DISCUSS/DSCN MKR DOCD: CPT | Performed by: PHYSICIAN ASSISTANT

## 2024-08-15 PROCEDURE — G8420 CALC BMI NORM PARAMETERS: HCPCS | Performed by: PHYSICIAN ASSISTANT

## 2024-08-15 NOTE — PROGRESS NOTES
90 tablet, Rfl: 1    Lactobacillus (PROBIOTIC ACIDOPHILUS PO), Take by mouth daily, Disp: , Rfl:     lipase-protease-amylase (CREON) 01084-973421 units CPEP delayed release capsule, Take 2 capsules by mouth 3 times daily (with meals), Disp: , Rfl:     tolterodine (DETROL LA) 4 MG extended release capsule, Take 1 capsule by mouth daily, Disp: , Rfl:     insulin lispro (HUMALOG) 100 UNIT/ML SOLN injection vial, Inject into the skin Pt uses sliding scale, Disp: , Rfl:     bimatoprost (LUMIGAN) 0.01 % SOLN ophthalmic drops, Place 1 drop into both eyes nightly, Disp: , Rfl:     solifenacin (VESICARE) 5 MG tablet, Take 1 tablet by mouth daily, Disp: , Rfl:     losartan (COZAAR) 25 MG tablet, Take 1 tablet by mouth daily FOR VACATION SUPPLY, Disp: 30 tablet, Rfl: 0    atorvastatin (LIPITOR) 20 MG tablet, Take 1 tablet by mouth daily, Disp: , Rfl:     metoprolol (LOPRESSOR) 100 MG tablet, Take 1 tablet by mouth 2 times daily, Disp: , Rfl:     SITagliptin (JANUVIA) 50 MG tablet, Take 1 tablet by mouth daily, Disp: , Rfl:     apixaban (ELIQUIS) 2.5 MG TABS tablet, Take by mouth 2 times daily, Disp: , Rfl:     aspirin 81 MG tablet, Take 1 tablet by mouth daily, Disp: , Rfl:     omeprazole (PRILOSEC) 40 MG delayed release capsule, Take 1 capsule by mouth nightly, Disp: , Rfl:     insulin glargine (LANTUS) 100 UNIT/ML injection, nightly Pt uses sliding scale, Disp: , Rfl:   Allergies   Allergen Reactions    Coumadin [Warfarin Sodium] Swelling and Rash    Morphine Nausea And Vomiting, Swelling and Rash    Doxycycline Rash     Social History     Socioeconomic History    Marital status:      Spouse name: Not on file    Number of children: Not on file    Years of education: Not on file    Highest education level: Not on file   Occupational History    Not on file   Tobacco Use    Smoking status: Never    Smokeless tobacco: Never   Vaping Use    Vaping status: Never Used   Substance and Sexual Activity    Alcohol use: No

## 2024-08-26 ENCOUNTER — HOSPITAL ENCOUNTER (OUTPATIENT)
Dept: INTERVENTIONAL RADIOLOGY/VASCULAR | Age: 79
Discharge: HOME | End: 2024-08-28
Payer: MEDICARE

## 2024-08-26 DIAGNOSIS — M16.11 OSTEOARTHRITIS OF RIGHT HIP, UNSPECIFIED OSTEOARTHRITIS TYPE: ICD-10-CM

## 2024-08-26 PROCEDURE — 2500000003 HC RX 250 WO HCPCS: Performed by: PHYSICIAN ASSISTANT

## 2024-08-26 PROCEDURE — 6360000002 HC RX W HCPCS: Performed by: PHYSICIAN ASSISTANT

## 2024-08-26 PROCEDURE — 2709999900 IR ARTHR/ASP/INJ MAJOR JT/BURSA RIGHT WO US

## 2024-08-26 PROCEDURE — 77002 NEEDLE LOCALIZATION BY XRAY: CPT

## 2024-08-26 PROCEDURE — 6360000004 HC RX CONTRAST MEDICATION: Performed by: RADIOLOGY

## 2024-08-26 PROCEDURE — 20610 DRAIN/INJ JOINT/BURSA W/O US: CPT

## 2024-08-26 RX ORDER — LIDOCAINE HYDROCHLORIDE 10 MG/ML
4 INJECTION, SOLUTION EPIDURAL; INFILTRATION; INTRACAUDAL; PERINEURAL ONCE
Status: COMPLETED | OUTPATIENT
Start: 2024-08-26 | End: 2024-08-26

## 2024-08-26 RX ORDER — IOPAMIDOL 612 MG/ML
INJECTION, SOLUTION INTRAVASCULAR PRN
Status: COMPLETED | OUTPATIENT
Start: 2024-08-26 | End: 2024-08-26

## 2024-08-26 RX ORDER — METHYLPREDNISOLONE ACETATE 80 MG/ML
80 INJECTION, SUSPENSION INTRA-ARTICULAR; INTRALESIONAL; INTRAMUSCULAR; SOFT TISSUE ONCE
Status: COMPLETED | OUTPATIENT
Start: 2024-08-26 | End: 2024-08-26

## 2024-08-26 RX ORDER — BUPIVACAINE HYDROCHLORIDE 5 MG/ML
4 INJECTION, SOLUTION EPIDURAL; INTRACAUDAL ONCE
Status: COMPLETED | OUTPATIENT
Start: 2024-08-26 | End: 2024-08-26

## 2024-08-26 RX ADMIN — METHYLPREDNISOLONE ACETATE 80 MG: 80 INJECTION, SUSPENSION INTRA-ARTICULAR; INTRALESIONAL; INTRAMUSCULAR; SOFT TISSUE at 14:38

## 2024-08-26 RX ADMIN — BUPIVACAINE HYDROCHLORIDE 20 MG: 5 INJECTION, SOLUTION EPIDURAL; INTRACAUDAL; PERINEURAL at 14:38

## 2024-08-26 RX ADMIN — LIDOCAINE HYDROCHLORIDE 4 ML: 10 INJECTION, SOLUTION EPIDURAL; INFILTRATION; INTRACAUDAL; PERINEURAL at 14:38

## 2024-08-26 RX ADMIN — IOPAMIDOL 2 ML: 612 INJECTION, SOLUTION INTRAVENOUS at 14:40

## 2024-08-26 NOTE — PROGRESS NOTES
Patient tolerated right hip injection without distress. Dressing to site. Discharge instructions given, no questions at this time. Patient discharged home via private auto.

## 2024-11-05 ENCOUNTER — OFFICE VISIT (OUTPATIENT)
Dept: ORTHOPEDIC SURGERY | Age: 79
End: 2024-11-05
Payer: MEDICARE

## 2024-11-05 VITALS — BODY MASS INDEX: 22.98 KG/M2 | HEIGHT: 66 IN | WEIGHT: 143 LBS | RESPIRATION RATE: 14 BRPM

## 2024-11-05 DIAGNOSIS — G89.29 CHRONIC LOW BACK PAIN WITHOUT SCIATICA, UNSPECIFIED BACK PAIN LATERALITY: ICD-10-CM

## 2024-11-05 DIAGNOSIS — M47.816 LUMBAR SPONDYLOSIS: ICD-10-CM

## 2024-11-05 DIAGNOSIS — M43.10 ACQUIRED SPONDYLOLISTHESIS: ICD-10-CM

## 2024-11-05 DIAGNOSIS — M54.9 BACK PAIN, UNSPECIFIED BACK LOCATION, UNSPECIFIED BACK PAIN LATERALITY, UNSPECIFIED CHRONICITY: Primary | ICD-10-CM

## 2024-11-05 DIAGNOSIS — M54.50 CHRONIC LOW BACK PAIN WITHOUT SCIATICA, UNSPECIFIED BACK PAIN LATERALITY: ICD-10-CM

## 2024-11-05 DIAGNOSIS — M16.10 HIP ARTHRITIS: ICD-10-CM

## 2024-11-05 PROCEDURE — 1123F ACP DISCUSS/DSCN MKR DOCD: CPT | Performed by: PHYSICIAN ASSISTANT

## 2024-11-05 PROCEDURE — 1036F TOBACCO NON-USER: CPT | Performed by: PHYSICIAN ASSISTANT

## 2024-11-05 PROCEDURE — 99213 OFFICE O/P EST LOW 20 MIN: CPT | Performed by: PHYSICIAN ASSISTANT

## 2024-11-05 PROCEDURE — G8484 FLU IMMUNIZE NO ADMIN: HCPCS | Performed by: PHYSICIAN ASSISTANT

## 2024-11-05 PROCEDURE — G8428 CUR MEDS NOT DOCUMENT: HCPCS | Performed by: PHYSICIAN ASSISTANT

## 2024-11-05 PROCEDURE — 1125F AMNT PAIN NOTED PAIN PRSNT: CPT | Performed by: PHYSICIAN ASSISTANT

## 2024-11-05 PROCEDURE — G8420 CALC BMI NORM PARAMETERS: HCPCS | Performed by: PHYSICIAN ASSISTANT

## 2024-11-05 RX ORDER — METHYLPREDNISOLONE 4 MG/1
4 TABLET ORAL SEE ADMIN INSTRUCTIONS
Qty: 1 KIT | Refills: 0 | Status: SHIPPED | OUTPATIENT
Start: 2024-11-05 | End: 2024-11-11

## 2024-11-05 NOTE — PROGRESS NOTES
Patient ID: Jose M Tovar is a 79 y.o. male    Chief Compliant:  Chief Complaint   Patient presents with    Lower Back Pain     From the top of the right hip across the lower back      HPI:  This is a 79 y.o. male who presents to the clinic today for evaluation of low back pain.  Patient with low back pain and top of right hip discomfort for the past 2 weeks.  Patient denies any recent injury trauma or fall.  Patient is seen Rinku Calvert PA-C in August 15, 2024.  Previously has been treated for trochanteric bursitis with his last injection being on 12/20/2023 as well as an IR injection on 8/26/2024.  He states in the last 2 weeks his pain started to return.  Most of his pain is mainly to his low back beltline region to the lateral aspect of his hip.  He describes his pain is slightly different than when he was.  Sitting directly in his hip at the time.  He denies any fall trauma or injury denies any bowel or bladder concern denies any previous surgeries to his back.      Review of Systems   All other systems reviewed and are negative.    Past History:    Current Outpatient Medications:     methylPREDNISolone (MEDROL DOSEPACK) 4 MG tablet, Take 1 tablet by mouth See Admin Instructions for 6 days Take by mouth., Disp: 1 kit, Rfl: 0    ferrous sulfate (IRON 325) 325 (65 Fe) MG tablet, Take 1 tablet by mouth daily (with breakfast), Disp: 90 tablet, Rfl: 1    Lactobacillus (PROBIOTIC ACIDOPHILUS PO), Take by mouth daily, Disp: , Rfl:     lipase-protease-amylase (CREON) 93849-634649 units CPEP delayed release capsule, Take 2 capsules by mouth 3 times daily (with meals), Disp: , Rfl:     tolterodine (DETROL LA) 4 MG extended release capsule, Take 1 capsule by mouth daily, Disp: , Rfl:     insulin lispro (HUMALOG) 100 UNIT/ML SOLN injection vial, Inject into the skin Pt uses sliding scale, Disp: , Rfl:     bimatoprost (LUMIGAN) 0.01 % SOLN ophthalmic drops, Place 1 drop into both eyes nightly, Disp: , Rfl:     solifenacin

## 2024-12-10 ENCOUNTER — OFFICE VISIT (OUTPATIENT)
Dept: ORTHOPEDIC SURGERY | Age: 79
End: 2024-12-10
Payer: MEDICARE

## 2024-12-10 VITALS — WEIGHT: 144 LBS | RESPIRATION RATE: 14 BRPM | HEIGHT: 66 IN | BODY MASS INDEX: 23.14 KG/M2

## 2024-12-10 DIAGNOSIS — M54.50 CHRONIC LOW BACK PAIN WITHOUT SCIATICA, UNSPECIFIED BACK PAIN LATERALITY: Primary | ICD-10-CM

## 2024-12-10 DIAGNOSIS — G89.29 CHRONIC LOW BACK PAIN WITHOUT SCIATICA, UNSPECIFIED BACK PAIN LATERALITY: Primary | ICD-10-CM

## 2024-12-10 PROCEDURE — G8428 CUR MEDS NOT DOCUMENT: HCPCS | Performed by: PHYSICIAN ASSISTANT

## 2024-12-10 PROCEDURE — G8484 FLU IMMUNIZE NO ADMIN: HCPCS | Performed by: PHYSICIAN ASSISTANT

## 2024-12-10 PROCEDURE — 1123F ACP DISCUSS/DSCN MKR DOCD: CPT | Performed by: PHYSICIAN ASSISTANT

## 2024-12-10 PROCEDURE — G8420 CALC BMI NORM PARAMETERS: HCPCS | Performed by: PHYSICIAN ASSISTANT

## 2024-12-10 PROCEDURE — 99213 OFFICE O/P EST LOW 20 MIN: CPT | Performed by: PHYSICIAN ASSISTANT

## 2024-12-10 PROCEDURE — 1125F AMNT PAIN NOTED PAIN PRSNT: CPT | Performed by: PHYSICIAN ASSISTANT

## 2024-12-10 PROCEDURE — 1036F TOBACCO NON-USER: CPT | Performed by: PHYSICIAN ASSISTANT

## 2024-12-10 NOTE — PROGRESS NOTES
normal.   Neck:      Musculoskeletal: Normal range of motion and neck supple.   Pulmonary:      Effort: Pulmonary effort is normal. No respiratory distress.   Neurological:      Mental Status: Alert and oriented to person, place, and time.      Sensory: No sensory deficit.   Psychiatric:         Behavior: Behavior normal.         Thought Content: Thought content normal.  Skin:     General: Skin is warm and dry.   Musculoskeletal:      Comments: Normal gait      Diagnostic imaging:    No new imaging taken today however reviewed previous imaging.  AP lateral lumbar spine demonstrates severe multilevel degenerative changes with osteophyte formation on the anterior aspect of the vertebrae as well as as well as narrowing disc space in the lower lumbar spine no evidence of spondylitic spondylolisthesis or spondylolisthesis. He does have significant arterial vascular disease. No evidence of acute fractures at this time. Bilateral hips do demonstrate subchondral sclerosis and joint space narrowing worse on the right than the left.     Assessment and Plan:  1. Chronic low back pain without sciatica, unspecified back pain laterality      79-year-old male presenting for follow-up of chronic right-sided low back pain which radiates in the lateral aspect of his right hip.  We previously been working patient up for bursitis as well as right hip arthritis where he has failed an injection into the right hip joint.  I do believe that most of his symptoms are more coming from back pathology than the right hip at this point.  Given he got no relief of the IR injection and he is complaining more of right buttock and low back discomfort.  Go ahead and order an MRI of his lumbar spine for further evaluation at that time.  Follow-up afterwards.  Patient is comfortable with this and verbalized understanding.      Follow up after MRI of lumbar spine    Chyna Claros PA-C    12/10/2024 12:59 PM    Please note that this chart was generated

## 2024-12-20 ENCOUNTER — HOSPITAL ENCOUNTER (OUTPATIENT)
Dept: MRI IMAGING | Age: 79
Discharge: HOME | End: 2024-12-22
Payer: MEDICARE

## 2024-12-20 DIAGNOSIS — M54.50 CHRONIC LOW BACK PAIN WITHOUT SCIATICA, UNSPECIFIED BACK PAIN LATERALITY: ICD-10-CM

## 2024-12-20 DIAGNOSIS — G89.29 CHRONIC LOW BACK PAIN WITHOUT SCIATICA, UNSPECIFIED BACK PAIN LATERALITY: ICD-10-CM

## 2024-12-20 PROCEDURE — 72148 MRI LUMBAR SPINE W/O DYE: CPT

## 2025-01-07 ENCOUNTER — OFFICE VISIT (OUTPATIENT)
Dept: ORTHOPEDIC SURGERY | Age: 80
End: 2025-01-07

## 2025-01-07 VITALS — WEIGHT: 143 LBS | BODY MASS INDEX: 22.98 KG/M2 | HEIGHT: 66 IN | RESPIRATION RATE: 18 BRPM

## 2025-01-07 DIAGNOSIS — M48.061 SPINAL STENOSIS OF LUMBAR REGION WITHOUT NEUROGENIC CLAUDICATION: Primary | ICD-10-CM

## 2025-01-07 DIAGNOSIS — M43.10 ACQUIRED SPONDYLOLISTHESIS: ICD-10-CM

## 2025-01-07 DIAGNOSIS — M47.812 CERVICAL SPONDYLOSIS: ICD-10-CM

## 2025-01-07 DIAGNOSIS — M54.2 NECK PAIN: ICD-10-CM

## 2025-01-07 RX ORDER — BACLOFEN 10 MG/1
10 TABLET ORAL 3 TIMES DAILY
Qty: 90 TABLET | Refills: 0 | Status: SHIPPED | OUTPATIENT
Start: 2025-01-07 | End: 2025-02-06

## 2025-01-07 NOTE — PROGRESS NOTES
Patient ID: Jose M Tovar is a 79 y.o. male    Chief Compliant:  Chief Complaint   Patient presents with    Pain     LUMBAR SPINE       HPI:  This is a 79 y.o. male who presents to the clinic today for MRI of lumbar spine follow up.  Patient does not report any back pain today.  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     Patient reports he is not really concerned about his back he is more concerned about his neck that flared up in the last week.  Patient has not previously been evaluated for his neck in the past so we get new x-rays today.  He denies any injury trauma or fall.  He woke up 1 morning feeling pain in his mid neck.  Denies any radicular symptoms or paresthesias in his upper extremities.  Denies any weakness has difficulty with rotation has not done any formal physical therapy for his neck not noted any symptoms like this in the past.      Review of Systems   All other systems reviewed and are negative.    Past History:    Current Outpatient Medications:     baclofen (LIORESAL) 10 MG tablet, Take 1 tablet by mouth 3 times daily, Disp: 90 tablet, Rfl: 0    ferrous sulfate (IRON 325) 325 (65 Fe) MG tablet, Take 1 tablet by mouth daily (with breakfast), Disp: 90 tablet, Rfl: 1    Lactobacillus (PROBIOTIC ACIDOPHILUS PO), Take by mouth daily, Disp: , Rfl:     lipase-protease-amylase (CREON) 59953-294537 units CPEP delayed release capsule, Take 2 capsules by mouth 3 times daily (with meals), Disp: , Rfl:     tolterodine (DETROL LA) 4 MG extended release capsule, Take 1 capsule by mouth daily, Disp: , Rfl:     insulin lispro (HUMALOG) 100 UNIT/ML SOLN injection vial, Inject into the skin Pt uses sliding scale, Disp: , Rfl:     bimatoprost (LUMIGAN) 0.01 % SOLN ophthalmic drops, Place 1 drop into both eyes nightly, Disp: , Rfl:     solifenacin (VESICARE) 5 MG tablet, Take 1 tablet by mouth daily, Disp: , Rfl:     losartan (COZAAR) 25 MG tablet, Take 1 tablet by mouth